# Patient Record
Sex: FEMALE | Race: WHITE | NOT HISPANIC OR LATINO | Employment: FULL TIME | URBAN - METROPOLITAN AREA
[De-identification: names, ages, dates, MRNs, and addresses within clinical notes are randomized per-mention and may not be internally consistent; named-entity substitution may affect disease eponyms.]

---

## 2018-02-08 ENCOUNTER — OFFICE VISIT (OUTPATIENT)
Dept: FAMILY MEDICINE CLINIC | Facility: CLINIC | Age: 38
End: 2018-02-08

## 2018-02-08 VITALS
HEIGHT: 69 IN | RESPIRATION RATE: 20 BRPM | SYSTOLIC BLOOD PRESSURE: 118 MMHG | BODY MASS INDEX: 34.36 KG/M2 | DIASTOLIC BLOOD PRESSURE: 62 MMHG | HEART RATE: 102 BPM | WEIGHT: 232 LBS | OXYGEN SATURATION: 98 % | TEMPERATURE: 98.2 F

## 2018-02-08 DIAGNOSIS — J06.9 URI WITH COUGH AND CONGESTION: ICD-10-CM

## 2018-02-08 DIAGNOSIS — J02.9 SORE THROAT: Primary | ICD-10-CM

## 2018-02-08 LAB
EXPIRATION DATE: NORMAL
EXPIRATION DATE: NORMAL
FLUAV AG NPH QL: NEGATIVE
FLUBV AG NPH QL: NEGATIVE
INTERNAL CONTROL: NORMAL
INTERNAL CONTROL: NORMAL
Lab: NORMAL
Lab: NORMAL
S PYO AG THROAT QL: NEGATIVE

## 2018-02-08 PROCEDURE — 87804 INFLUENZA ASSAY W/OPTIC: CPT | Performed by: NURSE PRACTITIONER

## 2018-02-08 PROCEDURE — 99202 OFFICE O/P NEW SF 15 MIN: CPT | Performed by: NURSE PRACTITIONER

## 2018-02-08 PROCEDURE — 87880 STREP A ASSAY W/OPTIC: CPT | Performed by: NURSE PRACTITIONER

## 2018-02-08 RX ORDER — DEXTROMETHORPHAN HYDROBROMIDE AND PROMETHAZINE HYDROCHLORIDE 15; 6.25 MG/5ML; MG/5ML
5 SYRUP ORAL 4 TIMES DAILY PRN
Qty: 240 ML | Refills: 0 | Status: SHIPPED | OUTPATIENT
Start: 2018-02-08 | End: 2018-02-18

## 2018-02-08 RX ORDER — ALBUTEROL SULFATE 90 UG/1
2 AEROSOL, METERED RESPIRATORY (INHALATION) EVERY 4 HOURS PRN
Qty: 1 INHALER | Refills: 0 | Status: SHIPPED | OUTPATIENT
Start: 2018-02-08 | End: 2018-03-10

## 2018-02-08 NOTE — PATIENT INSTRUCTIONS
"Upper Respiratory Infection, Adult  Most upper respiratory infections (URIs) are a viral infection of the air passages leading to the lungs. A URI affects the nose, throat, and upper air passages. The most common type of URI is nasopharyngitis and is typically referred to as \"the common cold.\"  URIs run their course and usually go away on their own. Most of the time, a URI does not require medical attention, but sometimes a bacterial infection in the upper airways can follow a viral infection. This is called a secondary infection. Sinus and middle ear infections are common types of secondary upper respiratory infections.  Bacterial pneumonia can also complicate a URI. A URI can worsen asthma and chronic obstructive pulmonary disease (COPD). Sometimes, these complications can require emergency medical care and may be life threatening.  What are the causes?  Almost all URIs are caused by viruses. A virus is a type of germ and can spread from one person to another.  What increases the risk?  You may be at risk for a URI if:  · You smoke.  · You have chronic heart or lung disease.  · You have a weakened defense (immune) system.  · You are very young or very old.  · You have nasal allergies or asthma.  · You work in crowded or poorly ventilated areas.  · You work in health care facilities or schools.  What are the signs or symptoms?  Symptoms typically develop 2-3 days after you come in contact with a cold virus. Most viral URIs last 7-10 days. However, viral URIs from the influenza virus (flu virus) can last 14-18 days and are typically more severe. Symptoms may include:  · Runny or stuffy (congested) nose.  · Sneezing.  · Cough.  · Sore throat.  · Headache.  · Fatigue.  · Fever.  · Loss of appetite.  · Pain in your forehead, behind your eyes, and over your cheekbones (sinus pain).  · Muscle aches.  How is this diagnosed?  Your health care provider may diagnose a URI by:  · Physical exam.  · Tests to check that your " symptoms are not due to another condition such as:  ¨ Strep throat.  ¨ Sinusitis.  ¨ Pneumonia.  ¨ Asthma.  How is this treated?  A URI goes away on its own with time. It cannot be cured with medicines, but medicines may be prescribed or recommended to relieve symptoms. Medicines may help:  · Reduce your fever.  · Reduce your cough.  · Relieve nasal congestion.  Follow these instructions at home:  · Take medicines only as directed by your health care provider.  · Gargle warm saltwater or take cough drops to comfort your throat as directed by your health care provider.  · Use a warm mist humidifier or inhale steam from a shower to increase air moisture. This may make it easier to breathe.  · Drink enough fluid to keep your urine clear or pale yellow.  · Eat soups and other clear broths and maintain good nutrition.  · Rest as needed.  · Return to work when your temperature has returned to normal or as your health care provider advises. You may need to stay home longer to avoid infecting others. You can also use a face mask and careful hand washing to prevent spread of the virus.  · Increase the usage of your inhaler if you have asthma.  · Do not use any tobacco products, including cigarettes, chewing tobacco, or electronic cigarettes. If you need help quitting, ask your health care provider.  How is this prevented?  The best way to protect yourself from getting a cold is to practice good hygiene.  · Avoid oral or hand contact with people with cold symptoms.  · Wash your hands often if contact occurs.  There is no clear evidence that vitamin C, vitamin E, echinacea, or exercise reduces the chance of developing a cold. However, it is always recommended to get plenty of rest, exercise, and practice good nutrition.  Contact a health care provider if:  · You are getting worse rather than better.  · Your symptoms are not controlled by medicine.  · You have chills.  · You have worsening shortness of breath.  · You have brown  or red mucus.  · You have yellow or brown nasal discharge.  · You have pain in your face, especially when you bend forward.  · You have a fever.  · You have swollen neck glands.  · You have pain while swallowing.  · You have white areas in the back of your throat.  Get help right away if:  · You have severe or persistent:  ¨ Headache.  ¨ Ear pain.  ¨ Sinus pain.  ¨ Chest pain.  · You have chronic lung disease and any of the following:  ¨ Wheezing.  ¨ Prolonged cough.  ¨ Coughing up blood.  ¨ A change in your usual mucus.  · You have a stiff neck.  · You have changes in your:  ¨ Vision.  ¨ Hearing.  ¨ Thinking.  ¨ Mood.  This information is not intended to replace advice given to you by your health care provider. Make sure you discuss any questions you have with your health care provider.  Document Released: 06/13/2002 Document Revised: 08/20/2017 Document Reviewed: 03/25/2015  [employer] Interactive Patient Education © 2017 [employer]

## 2018-02-08 NOTE — PROGRESS NOTES
"Subjective   Shaunna Vora is a 37 y.o. female.   No chief complaint on file.   Acute Visit  URI    This is a recurrent problem. The current episode started 1 to 4 weeks ago. The problem has been unchanged. There has been no fever. Associated symptoms include congestion, coughing, headaches, a plugged ear sensation, rhinorrhea, sinus pain, sneezing and a sore throat. Pertinent negatives include no ear pain, nausea, vomiting or wheezing. She has tried decongestant and NSAIDs for the symptoms. The treatment provided mild relief.    She reports she was seen 2 weeks ago at a [facility] and was treated for the flu - she completed tamiflu. She returned on 01/26 and was started on ceftin for upper respiratory infection.   She is here today with worsening symptoms 2 days ago. Sore throat was worse. She works at [employer] in [employer].   Her father is positive for influenza A.     The following portions of the patient's history were reviewed and updated as appropriate: allergies, current medications, past family history, past medical history, past social history, past surgical history and problem list.    Review of Systems   Constitutional: Positive for chills and fatigue. Negative for activity change, appetite change and fever.   HENT: Positive for congestion, postnasal drip, rhinorrhea, sinus pain, sneezing and sore throat. Negative for ear pain.    Respiratory: Positive for cough and shortness of breath. Negative for wheezing.         Loose cough - non productive.      Gastrointestinal: Negative.  Negative for abdominal distention, constipation, nausea and vomiting.   Neurological: Positive for headaches.       Objective   Allergies   Allergen Reactions   • Hydrocodone Nausea And Vomiting     Visit Vitals   • /62   • Pulse 102   • Temp 98.2 °F (36.8 °C) (Oral)   • Resp 20   • Ht 175.3 cm (69\")   • Wt 105 kg (232 lb)   • LMP 01/10/2018 (Exact Date)   • SpO2 98%   • BMI 34.26 kg/m2     Physical Exam   Constitutional: She is " oriented to person, place, and time. She appears well-developed and well-nourished. She is cooperative. She does not appear ill.   HENT:   Head: Normocephalic.   Nose: Rhinorrhea present. Right sinus exhibits no maxillary sinus tenderness and no frontal sinus tenderness. Left sinus exhibits no maxillary sinus tenderness and no frontal sinus tenderness.   Mouth/Throat: Uvula is midline. Posterior oropharyngeal erythema present.   Cobblestoning present - posterior oropharyngeal    Eyes: Pupils are equal, round, and reactive to light.   Cardiovascular: Regular rhythm, S1 normal, S2 normal and normal heart sounds.  Tachycardia present.    Pulmonary/Chest: Effort normal and breath sounds normal. No respiratory distress. She has no wheezes.   Lymphadenopathy:     She has no cervical adenopathy.   Neurological: She is alert and oriented to person, place, and time.   Skin: Skin is warm and dry.   Psychiatric: She has a normal mood and affect. Her behavior is normal.   Vitals reviewed.      Assessment/Plan   Diagnoses and all orders for this visit:    Sore throat  -     POCT Influenza A/B  -     POCT rapid strep A    URI with cough and congestion  -     Chlorcyclizine-Pseudoephed 25-60 MG tablet; Take 25 mg by mouth 2 (Two) Times a Day for 21 days.  -     promethazine-dextromethorphan (PROMETHAZINE-DM) 6.25-15 MG/5ML syrup; Take 5 mL by mouth 4 (Four) Times a Day As Needed for Cough for up to 10 days.  -     albuterol (PROVENTIL HFA;VENTOLIN HFA) 108 (90 Base) MCG/ACT inhaler; Inhale 2 puffs Every 4 (Four) Hours As Needed for Wheezing or Shortness of Air (or cough you cannont get under control) for up to 30 days.    see instructions for URI    Follow up with your  PCP as needed.                FRANCISCO J Gtz

## 2019-08-06 ENCOUNTER — OFFICE VISIT (OUTPATIENT)
Dept: INTERNAL MEDICINE | Facility: CLINIC | Age: 39
End: 2019-08-06

## 2019-08-06 VITALS
HEART RATE: 88 BPM | WEIGHT: 223.8 LBS | BODY MASS INDEX: 33.05 KG/M2 | OXYGEN SATURATION: 99 % | DIASTOLIC BLOOD PRESSURE: 108 MMHG | SYSTOLIC BLOOD PRESSURE: 146 MMHG

## 2019-08-06 DIAGNOSIS — R49.0 HOARSENESS OF VOICE: ICD-10-CM

## 2019-08-06 DIAGNOSIS — E04.1 THYROID NODULE: ICD-10-CM

## 2019-08-06 DIAGNOSIS — R03.0 ELEVATED BLOOD PRESSURE READING: ICD-10-CM

## 2019-08-06 DIAGNOSIS — N91.5 OLIGOMENORRHEA, UNSPECIFIED TYPE: ICD-10-CM

## 2019-08-06 DIAGNOSIS — E03.9 HYPOTHYROIDISM, UNSPECIFIED TYPE: Primary | ICD-10-CM

## 2019-08-06 PROCEDURE — 99203 OFFICE O/P NEW LOW 30 MIN: CPT | Performed by: NURSE PRACTITIONER

## 2019-08-06 PROCEDURE — 84443 ASSAY THYROID STIM HORMONE: CPT | Performed by: NURSE PRACTITIONER

## 2019-08-06 PROCEDURE — 84439 ASSAY OF FREE THYROXINE: CPT | Performed by: NURSE PRACTITIONER

## 2019-08-06 PROCEDURE — 80053 COMPREHEN METABOLIC PANEL: CPT | Performed by: NURSE PRACTITIONER

## 2019-08-06 PROCEDURE — 84146 ASSAY OF PROLACTIN: CPT | Performed by: NURSE PRACTITIONER

## 2019-08-06 PROCEDURE — 85025 COMPLETE CBC W/AUTO DIFF WBC: CPT | Performed by: NURSE PRACTITIONER

## 2019-08-06 NOTE — PATIENT INSTRUCTIONS
"DASH Eating Plan  DASH stands for \"Dietary Approaches to Stop Hypertension.\" The DASH eating plan is a healthy eating plan that has been shown to reduce high blood pressure (hypertension). It may also reduce your risk for type 2 diabetes, heart disease, and stroke. The DASH eating plan may also help with weight loss.  What are tips for following this plan?    General guidelines  · Avoid eating more than 2,300 mg (milligrams) of salt (sodium) a day. If you have hypertension, you may need to reduce your sodium intake to 1,500 mg a day.  · Limit alcohol intake to no more than 1 drink a day for nonpregnant women and 2 drinks a day for men. One drink equals 12 oz of beer, 5 oz of wine, or 1½ oz of hard liquor.  · Work with your health care provider to maintain a healthy body weight or to lose weight. Ask what an ideal weight is for you.  · Get at least 30 minutes of exercise that causes your heart to beat faster (aerobic exercise) most days of the week. Activities may include walking, swimming, or biking.  · Work with your health care provider or diet and nutrition specialist (dietitian) to adjust your eating plan to your individual calorie needs.  Reading food labels    · Check food labels for the amount of sodium per serving. Choose foods with less than 5 percent of the Daily Value of sodium. Generally, foods with less than 300 mg of sodium per serving fit into this eating plan.  · To find whole grains, look for the word \"whole\" as the first word in the ingredient list.  Shopping  · Buy products labeled as \"low-sodium\" or \"no salt added.\"  · Buy fresh foods. Avoid canned foods and premade or frozen meals.  Cooking  · Avoid adding salt when cooking. Use salt-free seasonings or herbs instead of table salt or sea salt. Check with your health care provider or pharmacist before using salt substitutes.  · Do not prieto foods. Cook foods using healthy methods such as baking, boiling, grilling, and broiling instead.  · Cook with " heart-healthy oils, such as olive, canola, soybean, or sunflower oil.  Meal planning  · Eat a balanced diet that includes:  ? 5 or more servings of fruits and vegetables each day. At each meal, try to fill half of your plate with fruits and vegetables.  ? Up to 6-8 servings of whole grains each day.  ? Less than 6 oz of lean meat, poultry, or fish each day. A 3-oz serving of meat is about the same size as a deck of cards. One egg equals 1 oz.  ? 2 servings of low-fat dairy each day.  ? A serving of nuts, seeds, or beans 5 times each week.  ? Heart-healthy fats. Healthy fats called Omega-3 fatty acids are found in foods such as flaxseeds and coldwater fish, like sardines, salmon, and mackerel.  · Limit how much you eat of the following:  ? Canned or prepackaged foods.  ? Food that is high in trans fat, such as fried foods.  ? Food that is high in saturated fat, such as fatty meat.  ? Sweets, desserts, sugary drinks, and other foods with added sugar.  ? Full-fat dairy products.  · Do not salt foods before eating.  · Try to eat at least 2 vegetarian meals each week.  · Eat more home-cooked food and less restaurant, buffet, and fast food.  · When eating at a restaurant, ask that your food be prepared with less salt or no salt, if possible.  What foods are recommended?  The items listed may not be a complete list. Talk with your dietitian about what dietary choices are best for you.  Grains  Whole-grain or whole-wheat bread. Whole-grain or whole-wheat pasta. Brown rice. Oatmeal. Quinoa. Bulgur. Whole-grain and low-sodium cereals. Tresa bread. Low-fat, low-sodium crackers. Whole-wheat flour tortillas.  Vegetables  Fresh or frozen vegetables (raw, steamed, roasted, or grilled). Low-sodium or reduced-sodium tomato and vegetable juice. Low-sodium or reduced-sodium tomato sauce and tomato paste. Low-sodium or reduced-sodium canned vegetables.  Fruits  All fresh, dried, or frozen fruit. Canned fruit in natural juice (without  added sugar).  Meat and other protein foods  Skinless chicken or turkey. Ground chicken or turkey. Pork with fat trimmed off. Fish and seafood. Egg whites. Dried beans, peas, or lentils. Unsalted nuts, nut butters, and seeds. Unsalted canned beans. Lean cuts of beef with fat trimmed off. Low-sodium, lean deli meat.  Dairy  Low-fat (1%) or fat-free (skim) milk. Fat-free, low-fat, or reduced-fat cheeses. Nonfat, low-sodium ricotta or cottage cheese. Low-fat or nonfat yogurt. Low-fat, low-sodium cheese.  Fats and oils  Soft margarine without trans fats. Vegetable oil. Low-fat, reduced-fat, or light mayonnaise and salad dressings (reduced-sodium). Canola, safflower, olive, soybean, and sunflower oils. Avocado.  Seasoning and other foods  Herbs. Spices. Seasoning mixes without salt. Unsalted popcorn and pretzels. Fat-free sweets.  What foods are not recommended?  The items listed may not be a complete list. Talk with your dietitian about what dietary choices are best for you.  Grains  Baked goods made with fat, such as croissants, muffins, or some breads. Dry pasta or rice meal packs.  Vegetables  Creamed or fried vegetables. Vegetables in a cheese sauce. Regular canned vegetables (not low-sodium or reduced-sodium). Regular canned tomato sauce and paste (not low-sodium or reduced-sodium). Regular tomato and vegetable juice (not low-sodium or reduced-sodium). Pickles. Olives.  Fruits  Canned fruit in a light or heavy syrup. Fried fruit. Fruit in cream or butter sauce.  Meat and other protein foods  Fatty cuts of meat. Ribs. Fried meat. Cota. Sausage. Bologna and other processed lunch meats. Salami. Fatback. Hotdogs. Bratwurst. Salted nuts and seeds. Canned beans with added salt. Canned or smoked fish. Whole eggs or egg yolks. Chicken or turkey with skin.  Dairy  Whole or 2% milk, cream, and half-and-half. Whole or full-fat cream cheese. Whole-fat or sweetened yogurt. Full-fat cheese. Nondairy creamers. Whipped toppings.  Processed cheese and cheese spreads.  Fats and oils  Butter. Stick margarine. Lard. Shortening. Ghee. Cota fat. Tropical oils, such as coconut, palm kernel, or palm oil.  Seasoning and other foods  Salted popcorn and pretzels. Onion salt, garlic salt, seasoned salt, table salt, and sea salt. Worcestershire sauce. Tartar sauce. Barbecue sauce. Teriyaki sauce. Soy sauce, including reduced-sodium. Steak sauce. Canned and packaged gravies. Fish sauce. Oyster sauce. Cocktail sauce. Horseradish that you find on the shelf. Ketchup. Mustard. Meat flavorings and tenderizers. Bouillon cubes. Hot sauce and Tabasco sauce. Premade or packaged marinades. Premade or packaged taco seasonings. Relishes. Regular salad dressings.  Where to find more information:  · National Heart, Lung, and Blood [place]: www.nhlbi.nih.gov  · American Heart Association: www.heart.org  Summary  · The DASH eating plan is a healthy eating plan that has been shown to reduce high blood pressure (hypertension). It may also reduce your risk for type 2 diabetes, heart disease, and stroke.  · With the DASH eating plan, you should limit salt (sodium) intake to 2,300 mg a day. If you have hypertension, you may need to reduce your sodium intake to 1,500 mg a day.  · When on the DASH eating plan, aim to eat more fresh fruits and vegetables, whole grains, lean proteins, low-fat dairy, and heart-healthy fats.  · Work with your health care provider or diet and nutrition specialist (dietitian) to adjust your eating plan to your individual calorie needs.  This information is not intended to replace advice given to you by your health care provider. Make sure you discuss any questions you have with your health care provider.  Document Released: 12/06/2012 Document Revised: 12/11/2017 Document Reviewed: 12/11/2017  [employer] Interactive Patient Education © 2019 [employer]

## 2019-08-06 NOTE — PROGRESS NOTES
Chief Complaint   Patient presents with   • Establish Care       History of Present Illness  38 y.o.female presents for establish care and FU thyroid nodule. Prior pcp Matias Hancock who retired.   No chronic meds.  Hx migraines intermittent no meds.  Need thryoid checked with hx nodules in past couple years. had US radioactive pill told waterbased nodules thinks was done a couple years ago.  Positive Wt gain, irregular periods not as often. Voice hoarseness    Pap always normal last one around 2 years ago.  No hx of htn.    Review of Systems   Constitutional: Positive for fatigue and unexpected weight gain. Negative for chills and fever.   HENT: Positive for rhinorrhea and voice change.    Respiratory: Negative for shortness of breath.    Cardiovascular: Negative for chest pain, palpitations and leg swelling.   Gastrointestinal: Negative for abdominal pain, constipation, diarrhea, nausea and GERD.   Genitourinary: Positive for menstrual problem. Negative for difficulty urinating.   Musculoskeletal: Negative for arthralgias.   Skin: Negative for rash.   Neurological: Negative for headache.   Psychiatric/Behavioral: Negative for depressed mood. The patient is not nervous/anxious.        [facility]  The following portions of the patient's history were reviewed and updated as appropriate: allergies, current medications, past family history, past medical history, past social history, past surgical history and problem list.     Social hx:  Tobacco none  Alcohol none  Past Medical History:   Diagnosis Date   • Asthma     childhood   • GERD (gastroesophageal reflux disease)    • Migraines       Past Surgical History:   Procedure Laterality Date   • WISDOM TOOTH EXTRACTION        Allergies   Allergen Reactions   • Hydrocodone Nausea And Vomiting      Family History   Problem Relation Age of Onset   • Heart disease Mother    • COPD Father    • Cancer Maternal Grandmother    • Cancer Maternal Grandfather    • Cancer Paternal  Grandfather           No current outpatient medications on file.    VITALS:  BP (!) 146/108   Pulse 88   Wt 102 kg (223 lb 12.8 oz)   SpO2 99%   Breastfeeding? No   BMI 33.05 kg/m²     Physical Exam   Constitutional: She is oriented to person, place, and time. She appears well-developed and well-nourished. No distress.   HENT:   Head: Normocephalic.   Right Ear: External ear normal.   Left Ear: External ear normal.   Nose: Nose normal.   Mouth/Throat: Oropharynx is clear and moist.   Eyes: EOM are normal. Pupils are equal, round, and reactive to light.   Neck: Normal range of motion. Neck supple. Thyromegaly present.   Thyroid enlarged on right   Cardiovascular: Normal rate, regular rhythm, normal heart sounds and intact distal pulses.   No edema   Pulmonary/Chest: Effort normal and breath sounds normal. No respiratory distress.   Abdominal: Soft. Bowel sounds are normal. There is no tenderness.   Musculoskeletal: Normal range of motion.   Normal ROM all major joints   Lymphadenopathy:     She has no cervical adenopathy.   Neurological: She is alert and oriented to person, place, and time.   Skin: Skin is warm and dry. Capillary refill takes less than 2 seconds. No rash noted.   Psychiatric: She has a normal mood and affect. Her behavior is normal.   Vitals reviewed.      LABS  Results for orders placed or performed in visit on 08/06/19   Comprehensive Metabolic Panel   Result Value Ref Range    Glucose 101 (H) 65 - 99 mg/dL    BUN 16 6 - 20 mg/dL    Creatinine 0.93 0.57 - 1.00 mg/dL    Sodium 140 136 - 145 mmol/L    Potassium 4.4 3.5 - 5.2 mmol/L    Chloride 102 98 - 107 mmol/L    CO2 25.2 22.0 - 29.0 mmol/L    Calcium 9.5 8.6 - 10.5 mg/dL    Total Protein 7.6 6.0 - 8.5 g/dL    Albumin 4.60 3.50 - 5.20 g/dL    ALT (SGPT) 47 (H) 1 - 33 U/L    AST (SGOT) 27 1 - 32 U/L    Alkaline Phosphatase 55 39 - 117 U/L    Total Bilirubin 0.3 0.2 - 1.2 mg/dL    eGFR Non African Amer 68 >60 mL/min/1.73    Globulin 3.0 gm/dL     A/G Ratio 1.5 g/dL    BUN/Creatinine Ratio 17.2 7.0 - 25.0    Anion Gap 12.8 5.0 - 15.0 mmol/L   TSH   Result Value Ref Range    TSH 31.700 (H) 0.270 - 4.200 mIU/mL   T4, Free   Result Value Ref Range    Free T4 0.77 (L) 0.93 - 1.70 ng/dL   Prolactin   Result Value Ref Range    Prolactin 13.70 4.79 - 23.30 ng/mL   CBC Auto Differential   Result Value Ref Range    WBC 6.60 3.40 - 10.80 10*3/mm3    RBC 4.81 3.77 - 5.28 10*6/mm3    Hemoglobin 13.5 12.0 - 15.9 g/dL    Hematocrit 42.6 34.0 - 46.6 %    MCV 88.6 79.0 - 97.0 fL    MCH 28.1 26.6 - 33.0 pg    MCHC 31.7 31.5 - 35.7 g/dL    RDW 13.2 12.3 - 15.4 %    RDW-SD 43.1 37.0 - 54.0 fl    MPV 10.4 6.0 - 12.0 fL    Platelets 232 140 - 450 10*3/mm3    Neutrophil % 62.9 42.7 - 76.0 %    Lymphocyte % 22.1 19.6 - 45.3 %    Monocyte % 8.0 5.0 - 12.0 %    Eosinophil % 5.3 0.3 - 6.2 %    Basophil % 1.1 0.0 - 1.5 %    Immature Grans % 0.6 (H) 0.0 - 0.5 %    Neutrophils, Absolute 4.15 1.70 - 7.00 10*3/mm3    Lymphocytes, Absolute 1.46 0.70 - 3.10 10*3/mm3    Monocytes, Absolute 0.53 0.10 - 0.90 10*3/mm3    Eosinophils, Absolute 0.35 0.00 - 0.40 10*3/mm3    Basophils, Absolute 0.07 0.00 - 0.20 10*3/mm3    Immature Grans, Absolute 0.04 0.00 - 0.05 10*3/mm3    nRBC 0.0 0.0 - 0.2 /100 WBC       ASSESSMENT/PLAN  Shaunna was seen today for establish care.    Diagnoses and all orders for this visit:    Hypothyroidism, unspecified type  -     levothyroxine (SYNTHROID, LEVOTHROID) 75 MCG tablet; Take 1 tablet by mouth Daily.  -     Ambulatory Referral to Endocrinology    Thyroid nodule  -     TSH  -     T4, Free  -     CBC Auto Differential  -     Ambulatory Referral to Endocrinology    Hoarseness of voice  -     Prolactin  -     Testosterone    Oligomenorrhea, unspecified type  -     Prolactin  -     Testosterone    Elevated blood pressure reading  Comments:  DASH diet; need low sodium diet <1500mg day. heart healthy diet exercise. will recheck at next visit. if remains elevated will  discuss meds. goal <130/80  Orders:  -     CBC & Differential  -     Comprehensive Metabolic Panel      I discussed the patients findings and my recommendations with patient.  Patient was encouraged to keep me informed of any acute changes, lack of improvement, or any new concerning symptoms.    Patient voiced understanding of all instructions and denied further questions.      FOLLOW-UP  Return in about 6 weeks (around 9/17/2019), or if symptoms worsen or fail to improve.  Need fu in 6 weeks to recheck thyroid labs  Electronically signed by:    FRANCISCO J Alford  08/06/2019

## 2019-08-07 LAB
ALBUMIN SERPL-MCNC: 4.6 G/DL (ref 3.5–5.2)
ALBUMIN/GLOB SERPL: 1.5 G/DL
ALP SERPL-CCNC: 55 U/L (ref 39–117)
ALT SERPL W P-5'-P-CCNC: 47 U/L (ref 1–33)
ANION GAP SERPL CALCULATED.3IONS-SCNC: 12.8 MMOL/L (ref 5–15)
AST SERPL-CCNC: 27 U/L (ref 1–32)
BASOPHILS # BLD AUTO: 0.07 10*3/MM3 (ref 0–0.2)
BASOPHILS NFR BLD AUTO: 1.1 % (ref 0–1.5)
BILIRUB SERPL-MCNC: 0.3 MG/DL (ref 0.2–1.2)
BUN BLD-MCNC: 16 MG/DL (ref 6–20)
BUN/CREAT SERPL: 17.2 (ref 7–25)
CALCIUM SPEC-SCNC: 9.5 MG/DL (ref 8.6–10.5)
CHLORIDE SERPL-SCNC: 102 MMOL/L (ref 98–107)
CO2 SERPL-SCNC: 25.2 MMOL/L (ref 22–29)
CREAT BLD-MCNC: 0.93 MG/DL (ref 0.57–1)
DEPRECATED RDW RBC AUTO: 43.1 FL (ref 37–54)
EOSINOPHIL # BLD AUTO: 0.35 10*3/MM3 (ref 0–0.4)
EOSINOPHIL NFR BLD AUTO: 5.3 % (ref 0.3–6.2)
ERYTHROCYTE [DISTWIDTH] IN BLOOD BY AUTOMATED COUNT: 13.2 % (ref 12.3–15.4)
GFR SERPL CREATININE-BSD FRML MDRD: 68 ML/MIN/1.73
GLOBULIN UR ELPH-MCNC: 3 GM/DL
GLUCOSE BLD-MCNC: 101 MG/DL (ref 65–99)
HCT VFR BLD AUTO: 42.6 % (ref 34–46.6)
HGB BLD-MCNC: 13.5 G/DL (ref 12–15.9)
IMM GRANULOCYTES # BLD AUTO: 0.04 10*3/MM3 (ref 0–0.05)
IMM GRANULOCYTES NFR BLD AUTO: 0.6 % (ref 0–0.5)
LYMPHOCYTES # BLD AUTO: 1.46 10*3/MM3 (ref 0.7–3.1)
LYMPHOCYTES NFR BLD AUTO: 22.1 % (ref 19.6–45.3)
MCH RBC QN AUTO: 28.1 PG (ref 26.6–33)
MCHC RBC AUTO-ENTMCNC: 31.7 G/DL (ref 31.5–35.7)
MCV RBC AUTO: 88.6 FL (ref 79–97)
MONOCYTES # BLD AUTO: 0.53 10*3/MM3 (ref 0.1–0.9)
MONOCYTES NFR BLD AUTO: 8 % (ref 5–12)
NEUTROPHILS # BLD AUTO: 4.15 10*3/MM3 (ref 1.7–7)
NEUTROPHILS NFR BLD AUTO: 62.9 % (ref 42.7–76)
NRBC BLD AUTO-RTO: 0 /100 WBC (ref 0–0.2)
PLATELET # BLD AUTO: 232 10*3/MM3 (ref 140–450)
PMV BLD AUTO: 10.4 FL (ref 6–12)
POTASSIUM BLD-SCNC: 4.4 MMOL/L (ref 3.5–5.2)
PROLACTIN SERPL-MCNC: 13.7 NG/ML (ref 4.79–23.3)
PROT SERPL-MCNC: 7.6 G/DL (ref 6–8.5)
RBC # BLD AUTO: 4.81 10*6/MM3 (ref 3.77–5.28)
SODIUM BLD-SCNC: 140 MMOL/L (ref 136–145)
T4 FREE SERPL-MCNC: 0.77 NG/DL (ref 0.93–1.7)
TSH SERPL DL<=0.05 MIU/L-ACNC: 31.7 MIU/ML (ref 0.27–4.2)
WBC NRBC COR # BLD: 6.6 10*3/MM3 (ref 3.4–10.8)

## 2019-08-07 RX ORDER — LEVOTHYROXINE SODIUM 0.07 MG/1
75 TABLET ORAL DAILY
Qty: 30 TABLET | Refills: 1 | Status: SHIPPED | OUTPATIENT
Start: 2019-08-07 | End: 2019-09-30 | Stop reason: SDUPTHER

## 2019-09-27 ENCOUNTER — OFFICE VISIT (OUTPATIENT)
Dept: ENDOCRINOLOGY | Facility: CLINIC | Age: 39
End: 2019-09-27

## 2019-09-27 VITALS
DIASTOLIC BLOOD PRESSURE: 80 MMHG | HEART RATE: 86 BPM | SYSTOLIC BLOOD PRESSURE: 130 MMHG | WEIGHT: 227 LBS | OXYGEN SATURATION: 98 % | BODY MASS INDEX: 33.62 KG/M2 | HEIGHT: 69 IN

## 2019-09-27 DIAGNOSIS — R23.2 HOT FLASHES: ICD-10-CM

## 2019-09-27 DIAGNOSIS — E04.1 SOLITARY THYROID NODULE: ICD-10-CM

## 2019-09-27 DIAGNOSIS — E03.8 SECONDARY HYPOTHYROIDISM: Primary | ICD-10-CM

## 2019-09-27 LAB
ESTRADIOL SERPL HS-MCNC: 15.4 PG/ML
FSH SERPL-ACNC: 36.1 MIU/ML
LH SERPL-ACNC: 28.4 MIU/ML
T4 FREE SERPL-MCNC: 1.27 NG/DL (ref 0.93–1.7)
TESTOST SERPL-MCNC: 17.8 NG/DL (ref 8.4–48.1)
TSH SERPL DL<=0.05 MIU/L-ACNC: 4.2 UIU/ML (ref 0.27–4.2)

## 2019-09-27 PROCEDURE — 84439 ASSAY OF FREE THYROXINE: CPT | Performed by: NURSE PRACTITIONER

## 2019-09-27 PROCEDURE — 83001 ASSAY OF GONADOTROPIN (FSH): CPT | Performed by: NURSE PRACTITIONER

## 2019-09-27 PROCEDURE — 83002 ASSAY OF GONADOTROPIN (LH): CPT | Performed by: NURSE PRACTITIONER

## 2019-09-27 PROCEDURE — 82670 ASSAY OF TOTAL ESTRADIOL: CPT | Performed by: NURSE PRACTITIONER

## 2019-09-27 PROCEDURE — 99244 OFF/OP CNSLTJ NEW/EST MOD 40: CPT | Performed by: INTERNAL MEDICINE

## 2019-09-27 PROCEDURE — 84403 ASSAY OF TOTAL TESTOSTERONE: CPT | Performed by: NURSE PRACTITIONER

## 2019-09-27 PROCEDURE — 84443 ASSAY THYROID STIM HORMONE: CPT | Performed by: NURSE PRACTITIONER

## 2019-09-27 NOTE — PROGRESS NOTES
Chief Complaint   Patient presents with   • Establish Care   • Hypothyroidism     referred by FRANCISCO J Gould   • Thyroid Nodule        New patient who is being seen in consultation regarding thyroidism and history of thyroid nodules at the request of Viki Gould APRN HPI   Shaunna Vora is a 38 y.o. female with a history of migraines, GERD, asthma who presents for evaluation of hypothyroidism and thyroid nodules.    She reports a thyroid dysfunction was initially discovered last month and patient was started on levothyroxine 75 mcg daily.  Patient reports a history of thyroid uptake and scan. She also had thyroid ultrasound in the past and was told that she had fluid filled nodules. She reports this was approximately 10 years ago. She never had thyroid biopsy or repeat ultrasound. She denies any difficulty breathing or difficulty swallowing. She does report intermittent hoarseness over the past few years. She reports this is more prominent with long conversations or if she is especially tired.     Patient denies palpitations or anxiety.  Patient denies changes in bowel habits.  Patient  reports heat intolerance.  Patient reports changes in weight. Patient lost 60 pounds while on keto diet last year and has regained approximately 15 pounds.   Patient  Reports hair loss while on the keto diet which has now improved.  Patient denies tremor.  Patient reports decreased energy level.    Patient  history of previous head/neck radiation.  Patient denies history of recent iodine exposure.  Patient denies taking OTC supplements such as biotin.  Patient denies personal or family history of thyroid cancer.     She reports irregular periods for approximately 1 year. She reports she has been cycling every 1-3 months.  Patient also reports history of intermittent hot flashes over the past year.  She is reports that she first noticed these when she was adhering to the keto diet.  She is unsure if they are changed  since stopping this diet.  She reports that hot flashes typically last about a minute and can occur at any time of day.  She denies any associated symptoms such as palpitations headache or flushing of her cheeks.  She reports that her mother underwent menopause in her late 40s early 50s.    Past Medical History:   Diagnosis Date   • Asthma     childhood   • GERD (gastroesophageal reflux disease)    • Migraines      Past Surgical History:   Procedure Laterality Date   • WISDOM TOOTH EXTRACTION        Family History   Problem Relation Age of Onset   • Heart disease Mother    • Arthritis Mother    • Heart attack Mother    • Hyperlipidemia Mother    • Obesity Mother    • Diabetes Mother    • Hypertension Mother    • COPD Father    • Migraines Father    • Hypertension Father    • Cancer Maternal Grandmother    • Hyperthyroidism Maternal Grandmother    • Cancer Maternal Grandfather    • Cancer Paternal Grandfather    • Kidney disease Paternal Grandmother       Social History     Socioeconomic History   • Marital status: Single     Spouse name: Not on file   • Number of children: Not on file   • Years of education: Not on file   • Highest education level: Not on file   Tobacco Use   • Smoking status: Never Smoker   • Smokeless tobacco: Never Used   Substance and Sexual Activity   • Alcohol use: No   • Drug use: No   • Sexual activity: No      Allergies   Allergen Reactions   • Hydrocodone Nausea And Vomiting      Current Outpatient Medications on File Prior to Visit   Medication Sig Dispense Refill   • levothyroxine (SYNTHROID, LEVOTHROID) 75 MCG tablet Take 1 tablet by mouth Daily. 30 tablet 1     No current facility-administered medications on file prior to visit.         Review of Systems   Constitutional: Positive for fatigue. Negative for unexpected weight gain.   HENT: Positive for postnasal drip and voice change. Negative for trouble swallowing.    Eyes: Negative for double vision and itching.   Respiratory:  "Negative for cough and shortness of breath.    Gastrointestinal: Negative for abdominal pain, constipation, diarrhea and nausea.   Endocrine: Positive for heat intolerance. Negative for cold intolerance.   Genitourinary: Positive for menstrual problem.   Musculoskeletal: Negative for arthralgias and myalgias.   Skin: Negative for dry skin and rash.   Neurological: Negative for tremors and weakness.   Psychiatric/Behavioral: Negative for sleep disturbance. The patient is not nervous/anxious.         Vitals:    09/27/19 0956   BP: 130/80   Pulse: 86   SpO2: 98%   Weight: 103 kg (227 lb)   Height: 175.3 cm (69\")   Body mass index is 33.52 kg/m².     Physical Exam   Constitutional: Vital signs are normal. She appears well-developed and well-nourished. She is cooperative.   HENT:   Head: Normocephalic and atraumatic.   Mouth/Throat: Oropharynx is clear and moist and mucous membranes are normal.   Eyes: Conjunctivae and EOM are normal. Pupils are equal, round, and reactive to light.   Neck: Trachea normal. Thyromegaly present.   Patient with fullness of the right thyroid lobe.  No discrete nodules palpable.   Cardiovascular: Normal rate, regular rhythm and intact distal pulses.   No murmur heard.  Pulmonary/Chest: Effort normal and breath sounds normal. No respiratory distress.   Abdominal: Soft. Bowel sounds are normal. She exhibits no distension. There is no hepatosplenomegaly. There is no tenderness.   Lymphadenopathy:        Head (right side): No submandibular adenopathy present.        Head (left side): No submandibular adenopathy present.     She has no cervical adenopathy.   Neurological: She is alert. She has normal reflexes.   Skin: Skin is warm, dry and intact. No rash noted.   Psychiatric: She has a normal mood and affect. Her behavior is normal.        Labs/Imaging  Results for ROMINA POWERS (MRN [id number]) as of 9/27/2019 09:55   Ref. Range 5/11/2015 10:13 2/8/2018 15:18 2/8/2018 15:19 8/6/2019 15:47 "   TSH Baseline Latest Ref Range: 0.270 - 4.200 mIU/mL 4.530   31.700 (H)   Free T4 Latest Ref Range: 0.93 - 1.70 ng/dL    0.77 (L)     Assessment and Plan    Shaunna was seen today for establish care, hypothyroidism and thyroid nodule.    Diagnoses and all orders for this visit:    Secondary hypothyroidism  - Labs from early August indicative of hypothyroidism.  - Patient currently taking levothyroxine 75 mcg daily.  We reviewed appropriate administration of this medication.  - Will check labs today and adjust levothyroxine as clinically indicated.  -     T4, Free  -     TSH    Solitary thyroid nodule  - The patient reports history of cystic thyroid nodule.  Records of previous ultrasound are not available.  Will request records from PCP.  - Patient has fullness in the right thyroid lobe on exam.  Depending on results of previous ultrasound may need to consider repeat.  - Patient denies compressive symptoms.  She does have intermittent hoarseness   -     T4, Free  -     TSH    Hot flashes/irregular periods  - Discussed the patient's menstrual irregularities could be related to hypothyroidism.  However, given concurrent hot flashes will evaluate for laboratory evidence of early menopause.  -     Follicle Stimulating Hormone  -     Luteinizing Hormone  -     Estradiol         Return in about 3 months (around 12/27/2019) for Next scheduled follow up. The patient was instructed to contact the clinic with any interval questions or concerns.    Lorene Franklin MD     Please note that portions of this document were completed using a voice recognition program. Efforts were made to edit the dictations, but occasionally words are mis-transcribed.

## 2019-09-30 ENCOUNTER — TELEPHONE (OUTPATIENT)
Dept: ENDOCRINOLOGY | Facility: CLINIC | Age: 39
End: 2019-09-30

## 2019-09-30 DIAGNOSIS — E03.9 HYPOTHYROIDISM, UNSPECIFIED TYPE: ICD-10-CM

## 2019-09-30 RX ORDER — LEVOTHYROXINE SODIUM 0.07 MG/1
75 TABLET ORAL DAILY
Qty: 30 TABLET | Refills: 3 | Status: SHIPPED | OUTPATIENT
Start: 2019-09-30 | End: 2020-02-05 | Stop reason: SDUPTHER

## 2019-09-30 NOTE — TELEPHONE ENCOUNTER
11/20/19  Left message at the Medical Records office, [phone] asking for someone to return my call.  Need status of request for records.        11/15/19  Liza from [facility] medical records - [phone], states they don't have any of the medical records we requested for Dr. Hancock's office at [facility].    Thyroid uptake  Thyroid scan  Thyroid US      Dr. Franklin, please advice.  Thank you.            11/15/19  Spoke with Samira from Dr. Decker's office and she states she does not have anything related to patient's thyroid that she can see in her system.    Samira suggested for me to call Medical Records at [phone], they would help me get records for patient that are from years ago.  Call this number and left a message asking someone to call me back and let me know the status of my records request that I fax on 09/30/19.  Samira stated that she could see where medical records dept. Had received my request for medical records for the patient.          Patient call, asking if she needed more tests after Dr. Franklin sees her old medical records.  Left a message for patient explaining that I faxed the request for medical records on 9/30/19, but I have not received anything.  It can take up to 30 days, suggested to see if she can call Dr. Franklin Decker's office and get an update.  Will forward patient's message to Dr. Franklin.      Call Dr. Franklin Decker's office for un update on the request for records I faxed to [phone], on 9/30/19.  The message for records states it can take up to 30 days for records.  Left message asking for someone to call me and give me an update on the request.    Fax records request to Dr. Franklin Decker's office for:  Previous thyroid uptake and scan and thyroid ultrasounds from approximately 10 years ago.  To [phone].          Left message (Dr. Franklin Decker's office, [phone]) requesting medical records requested by Dr. Franklin, with patient's  information and my information as well.  Message states it takes up to 30 days for to obtain records.      ----- Message from Lorene Franklin MD sent at 9/30/2019  8:29 AM EDT -----  Ms. Ye,     Could you please request results of previous thyroid uptake and scan and thyroid ultrasounds from the office of patient's previous PCP (Dr. Hancock) at the [facility]? Of note, these are from approximately 10 years ago. Please let me know if they say these records are no longer accessible.    Sincerely,     Lorene

## 2019-09-30 NOTE — TELEPHONE ENCOUNTER
Informed patient of lab results.  Patient voiced understanding.    ----- Message from Lorene Franklin MD sent at 9/30/2019 10:41 AM EDT -----  Please contact the patient and let her know that I have reviewed her most recent labs. Her thyroid function tests are now within the reference range. She should continue on her current dose of levothyroxine. Also, her FSH and LH are at the upper end of the expected range while her estradiol is toward the lower end. These labs are somewhat difficult to interpret as the normal values vary based on the phases of the menstrual cycle and it is difficult to know which phase she is in as she has not be cycling regularly. However, the pattern could be consistent with early menopause. If her cycles do not normalize now that thyroid function is normal, she may need further evaluation with gynecology. Also, please let her know that I will contact her with an update once I am able to review records from her PCP regarding previous thyroid testing.

## 2019-09-30 NOTE — TELEPHONE ENCOUNTER
Left message for patient to return my call.    ----- Message from Lorene Franklin MD sent at 9/30/2019 10:41 AM EDT -----  Please contact the patient and let her know that I have reviewed her most recent labs. Her thyroid function tests are now within the reference range. She should continue on her current dose of levothyroxine. Also, her FSH and LH are at the upper end of the expected range while her estradiol is toward the lower end. These labs are somewhat difficult to interpret as the normal values vary based on the phases of the menstrual cycle and it is difficult to know which phase she is in as she has not be cycling regularly. However, the pattern could be consistent with early menopause. If her cycles do not normalize now that thyroid function is normal, she may need further evaluation with gynecology. Also, please let her know that I will contact her with an update once I am able to review records from her PCP regarding previous thyroid testing.

## 2019-09-30 NOTE — TELEPHONE ENCOUNTER
Patient requesting med refill for Levothyroxine.  Patient states she just has one weeks worth of medication left. Patient has appointment on 10/14/19.   Forwarding to Dr. Franklin for approval.

## 2019-10-01 NOTE — TELEPHONE ENCOUNTER
Hi from [facility] call, asking to see if they could refill the Levothyroxine with brand name, the are currently out of the generic brand..  Ok, to replace generic brand for name brand, pt has taken it before.        verbal order from Dr. Franklin, pt to continue with [facility] brand for Levothyroxine.  Fax to [facility] at [phone]

## 2019-10-14 ENCOUNTER — OFFICE VISIT (OUTPATIENT)
Dept: INTERNAL MEDICINE | Facility: CLINIC | Age: 39
End: 2019-10-14

## 2019-10-14 VITALS
HEART RATE: 101 BPM | DIASTOLIC BLOOD PRESSURE: 80 MMHG | SYSTOLIC BLOOD PRESSURE: 132 MMHG | WEIGHT: 226 LBS | BODY MASS INDEX: 33.47 KG/M2 | TEMPERATURE: 98.3 F | HEIGHT: 69 IN | OXYGEN SATURATION: 100 %

## 2019-10-14 DIAGNOSIS — I10 BENIGN ESSENTIAL HTN: Primary | ICD-10-CM

## 2019-10-14 PROCEDURE — 99213 OFFICE O/P EST LOW 20 MIN: CPT | Performed by: NURSE PRACTITIONER

## 2019-10-14 NOTE — PROGRESS NOTES
Chief Complaint   Patient presents with   • Hypertension     bp check       History of Present Illness  38 y.o.female presents for bp check.  Last seen in aug with elevated bp 140's/ 108. Has been doing lifestyle changes with diet and exercise. Doing better. bp improved. Has seen endocrine for her hypothyroid; taking synthroid ok. Menses still has not resumed and undergoing possible menopause workup.  Otherwise doing ok no headaches, vision changes, dizziness or chest pain.      Review of Systems   Constitutional: Negative for chills, fatigue and fever.   Eyes: Negative for blurred vision and visual disturbance.   Respiratory: Negative for shortness of breath.    Cardiovascular: Negative for chest pain, palpitations and leg swelling.   Genitourinary: Negative for difficulty urinating.   Neurological: Negative for dizziness, light-headedness and headache.       [facility]  The following portions of the patient's history were reviewed and updated as appropriate: allergies, current medications, past family history, past medical history, past social history, past surgical history and problem list.       Past Medical History:   Diagnosis Date   • Asthma     childhood   • GERD (gastroesophageal reflux disease)    • Migraines       Past Surgical History:   Procedure Laterality Date   • WISDOM TOOTH EXTRACTION        Allergies   Allergen Reactions   • Hydrocodone Nausea And Vomiting      Family History   Problem Relation Age of Onset   • Heart disease Mother    • Arthritis Mother    • Heart attack Mother    • Hyperlipidemia Mother    • Obesity Mother    • Diabetes Mother    • Hypertension Mother    • COPD Father    • Migraines Father    • Hypertension Father    • Cancer Maternal Grandmother    • Hyperthyroidism Maternal Grandmother    • Cancer Maternal Grandfather    • Cancer Paternal Grandfather    • Kidney disease Paternal Grandmother             Current Outpatient Medications:   •  levothyroxine (SYNTHROID, LEVOTHROID) 75 MCG  "tablet, Take 1 tablet by mouth Daily., Disp: 30 tablet, Rfl: 3    VITALS:  /80   Pulse 101   Temp 98.3 °F (36.8 °C)   Ht 175.3 cm (69\")   Wt 103 kg (226 lb)   SpO2 100%   BMI 33.37 kg/m²     Physical Exam   Constitutional: She appears well-developed and well-nourished. No distress.   Pulmonary/Chest: Effort normal.   Neurological: She is alert.   Skin: Skin is warm and dry.   Psychiatric: She has a normal mood and affect.       LABS  No new labs    ASSESSMENT/PLAN  Shaunna was seen today for hypertension.    Diagnoses and all orders for this visit:    Benign essential HTN    cont low sodium heart healthy diet exercise.   No bp meds for now since improved. Cont to monitor.    I discussed the patients findings and my recommendations with patient.  Patient was encouraged to keep me informed of any acute changes, lack of improvement, or any new concerning symptoms.    Patient voiced understanding of all instructions and denied further questions.      FOLLOW-UP  Return in about 6 months (around 4/14/2020), or if symptoms worsen or fail to improve, for Annual with pap.    Electronically signed by:    FRANCISCO J Alford  10/14/2019      "

## 2019-10-17 ENCOUNTER — TELEPHONE (OUTPATIENT)
Dept: INTERNAL MEDICINE | Facility: CLINIC | Age: 39
End: 2019-10-17

## 2019-10-17 NOTE — TELEPHONE ENCOUNTER
The patient will likely need an ultrasound of the thyroid regardless of her old test results, I was just hoping to get the records so that if we needed anything in addition to the US it could all be completed at once. However, if she wants me to go ahead and schedule the US given the long wait for her old records, I'm happy to go ahead and order it. Whichever she prefers is fine.

## 2019-10-17 NOTE — TELEPHONE ENCOUNTER
PATIENT IS CALLING IN REGARDS TO MOST RECENT VISIT WITH DR FREEDMAN. SHE WOULD LIKE TO KNOW IF SHE IS STILL NEEDING A CERTAIN AMOUNT OF TESTS BASED ON HER OLD MEDICAL RECORDS THAT WERE SENT TO US FROM HER OLD DR. PLEASE CONTACT -[phone]

## 2019-10-17 NOTE — TELEPHONE ENCOUNTER
Left message for patient informing her I had fax a request for medical records to Dr. Reid's office on 9/30/19.  However, it can take up to 30 days for us to get these records, based on a message they have on their medical records line.  Just call, left them a message asking for a callback/update on my request.    Dr. Franklin, please advice.  Does patient need more tests, or do you have to wait to see her old medical records?  Than edna.

## 2019-10-18 NOTE — TELEPHONE ENCOUNTER
Patient states she would like for us to call her on Monday, if possible with the Thyroid US information.  Therefore, we can go ahead and schedule it.  Informed patient that Dr. Franklin was out of the office this afternoon but  Hopefully we could contact her on Monday with more information on the US of the Thyroid.  Patient agreed.    Dr. Franklin, please can you schedule the thyroid US. Thank you.

## 2019-10-21 ENCOUNTER — TELEPHONE (OUTPATIENT)
Dept: INTERNAL MEDICINE | Facility: CLINIC | Age: 39
End: 2019-10-21

## 2019-10-21 NOTE — TELEPHONE ENCOUNTER
Per Dr. Franklin: Could you please contact MsKatherin Diony and schedule a thyroid US with Dr. Navarrete?    Lvm for patient to return my call to schedule.

## 2019-11-14 ENCOUNTER — OFFICE VISIT (OUTPATIENT)
Dept: ENDOCRINOLOGY | Facility: CLINIC | Age: 39
End: 2019-11-14

## 2019-11-14 VITALS
HEIGHT: 69 IN | OXYGEN SATURATION: 100 % | HEART RATE: 93 BPM | BODY MASS INDEX: 34.9 KG/M2 | WEIGHT: 235.6 LBS | DIASTOLIC BLOOD PRESSURE: 90 MMHG | SYSTOLIC BLOOD PRESSURE: 148 MMHG

## 2019-11-14 DIAGNOSIS — R93.89 ABNORMAL THYROID ULTRASOUND: Primary | ICD-10-CM

## 2019-11-14 PROCEDURE — 76536 US EXAM OF HEAD AND NECK: CPT | Performed by: INTERNAL MEDICINE

## 2019-11-14 NOTE — PROGRESS NOTES
Thyroid Ultrasound 11/14/19    Indication: Suspected thyroid nodule  Comparison Imaging: None  Clinical History:  Reported thyroid nodule/cyst 10 years ago, hypothyroidism    Real time high resolution imaging of the thyroid gland was performed in transverse and longitudinal planes. Previous imaging reports were reviewed if available and compared to the current to assess stability.     Lobes:  The right lobe measured 4.9 cm L x 2.1 cm AP x 2.5 cm in TV dimension.    The isthmus measured 0.75 cm in thickness.    The left thyroid lobe measured 6.8 cm L x 2.0 cm AP x 2.3 cm in TV dimension.    Thyroid gland is diffusely hypoechoic and heterogeneous and contains no nodules.  Due to the heterogeneous nature she has some areas of pseudo-nodules, but no distinct nodules.    No pathologic lymph nodes were seen.    Impression:  Heterogeneous and slightly enlarged gland consistent with likely autoimmune thyroid disease.    Recommendation:  No routine ultrasound monitoring is necessary.

## 2019-12-05 ENCOUNTER — TELEPHONE (OUTPATIENT)
Dept: ENDOCRINOLOGY | Facility: CLINIC | Age: 39
End: 2019-12-05

## 2019-12-05 NOTE — TELEPHONE ENCOUNTER
12/5/19  Left message on Medical Records Office recording.  There is no option to speak to someone.  Ask them to return my call.    Previous message was left on 11/20/19.

## 2019-12-05 NOTE — TELEPHONE ENCOUNTER
----- Message from Lorene Franklin MD sent at 9/30/2019  8:29 AM EDT -----  Ms. Ye,     Could you please request results of previous thyroid uptake and scan and thyroid ultrasounds from the office of patient's previous PCP (Dr. Hancock) at the [facility]? Of note, these are from approximately 10 years ago. Please let me know if they say these records are no longer accessible.    Sincerely,     Lorene

## 2019-12-19 NOTE — TELEPHONE ENCOUNTER
12/19/19  Left message asking for someone to contact me and let me know the status of the request for records I faxed back on 9/30/19.  Medical Records  [phone]    I have left several messages, asking for someone to contact me:    11/20/19 and 12/5/19  No one has gotten back to me as of today.

## 2019-12-23 ENCOUNTER — TELEPHONE (OUTPATIENT)
Dept: ENDOCRINOLOGY | Facility: CLINIC | Age: 39
End: 2019-12-23

## 2019-12-23 NOTE — TELEPHONE ENCOUNTER
Left a message for someone to return my call.  Medical Records  [phone].    Have previously called and left message, and as of today no one has returned my call.  Messages left on:  11/20/19, 12/5/19 and 12/19/19.

## 2019-12-23 NOTE — TELEPHONE ENCOUNTER
----- Message from Lorene Franklin MD sent at 9/30/2019  8:29 AM EDT -----  Ms. Ye,     Could you please request results of previous thyroid uptake and scan and thyroid ultrasounds from the office of patient's previous PCP (Dr. Hancock) at the [facility], [place]? Of note, these are from approximately 10 years ago. Please let me know if they say these records are no longer accessible.    Sincerely,     Lorene

## 2020-01-15 ENCOUNTER — TELEPHONE (OUTPATIENT)
Dept: ENDOCRINOLOGY | Facility: CLINIC | Age: 40
End: 2020-01-15

## 2020-01-15 NOTE — TELEPHONE ENCOUNTER
----- Message from Lorene Franklin MD sent at 9/30/2019  8:29 AM EDT -----  Ms. Ye,     Could you please request results of previous thyroid uptake and scan and thyroid ultrasounds from the office of patient's previous PCP (Dr. Hancock) at the [facility]? Of note, these are from approximately 10 years ago. Please let me know if they say these records are no longer accessible.    Sincerely,     Lorene

## 2020-01-15 NOTE — TELEPHONE ENCOUNTER
Call for status on request for records.    [facility] Medical records at [phone]  Left message asking for someone to return my call.  This is the 5th message I leave in this automatic recoding that I get when I call the medical records department.  As of today, no one has gotten back to me, and I still don't have the records I requested back in October, 2019.    Dr. Franklin, please advice.  Thank you.

## 2020-01-16 NOTE — TELEPHONE ENCOUNTER
Given patient's recent US, prior records unlikely to alter plan of care. Please document efforts to obtain records and then no further follow up is required.

## 2020-02-05 DIAGNOSIS — E03.9 HYPOTHYROIDISM, UNSPECIFIED TYPE: ICD-10-CM

## 2020-02-05 RX ORDER — LEVOTHYROXINE SODIUM 0.07 MG/1
75 TABLET ORAL DAILY
Qty: 30 TABLET | Refills: 3 | Status: SHIPPED | OUTPATIENT
Start: 2020-02-05 | End: 2020-06-15

## 2020-05-19 ENCOUNTER — TELEPHONE (OUTPATIENT)
Dept: INTERNAL MEDICINE | Facility: CLINIC | Age: 40
End: 2020-05-19

## 2020-05-19 NOTE — TELEPHONE ENCOUNTER
----- Message from Fabiola Ceballos sent at 5/19/2020  3:24 PM EDT -----      ----- Message -----  From: Viki Gould APRN  Sent: 5/19/2020  12:27 PM EDT  To: Fabiola Ceballos    Pt needs annual with pap rescheduled from April cancel please.

## 2020-05-19 NOTE — TELEPHONE ENCOUNTER
----- Message from FRANCISCO J Farrar sent at 5/17/2020  5:13 PM EDT -----  Can we contact pt to reschedule her annual with pap. Canceled in April due to pandemic.

## 2020-06-15 DIAGNOSIS — E03.9 HYPOTHYROIDISM, UNSPECIFIED TYPE: ICD-10-CM

## 2020-06-15 RX ORDER — LEVOTHYROXINE SODIUM 0.07 MG/1
TABLET ORAL
Qty: 30 TABLET | Refills: 0 | Status: SHIPPED | OUTPATIENT
Start: 2020-06-15 | End: 2020-07-24

## 2020-07-24 DIAGNOSIS — E03.9 HYPOTHYROIDISM, UNSPECIFIED TYPE: ICD-10-CM

## 2020-07-24 RX ORDER — LEVOTHYROXINE SODIUM 0.07 MG/1
TABLET ORAL
Qty: 30 TABLET | Refills: 0 | Status: SHIPPED | OUTPATIENT
Start: 2020-07-24 | End: 2020-08-12

## 2020-08-11 ENCOUNTER — LAB (OUTPATIENT)
Dept: LAB | Facility: HOSPITAL | Age: 40
End: 2020-08-11

## 2020-08-11 ENCOUNTER — OFFICE VISIT (OUTPATIENT)
Dept: ENDOCRINOLOGY | Facility: CLINIC | Age: 40
End: 2020-08-11

## 2020-08-11 VITALS
DIASTOLIC BLOOD PRESSURE: 78 MMHG | HEIGHT: 69 IN | SYSTOLIC BLOOD PRESSURE: 132 MMHG | WEIGHT: 241.4 LBS | OXYGEN SATURATION: 100 % | HEART RATE: 82 BPM | BODY MASS INDEX: 35.76 KG/M2

## 2020-08-11 DIAGNOSIS — E03.9 HYPOTHYROIDISM, UNSPECIFIED TYPE: Primary | ICD-10-CM

## 2020-08-11 DIAGNOSIS — L65.9 HAIR LOSS: ICD-10-CM

## 2020-08-11 LAB
T4 FREE SERPL-MCNC: 1.19 NG/DL (ref 0.93–1.7)
TSH SERPL DL<=0.05 MIU/L-ACNC: 9.26 UIU/ML (ref 0.27–4.2)

## 2020-08-11 PROCEDURE — 84443 ASSAY THYROID STIM HORMONE: CPT | Performed by: INTERNAL MEDICINE

## 2020-08-11 PROCEDURE — 99213 OFFICE O/P EST LOW 20 MIN: CPT | Performed by: INTERNAL MEDICINE

## 2020-08-11 PROCEDURE — 84439 ASSAY OF FREE THYROXINE: CPT | Performed by: INTERNAL MEDICINE

## 2020-08-11 NOTE — PROGRESS NOTES
"Chief Complaint   Patient presents with   • Follow-up   • Secondary Hypothyroidism     hair is thinning, since April 2020        HPI   Shaunna Vora is a 39 y.o. female had concerns including Follow-up and Secondary Hypothyroidism (hair is thinning, since April 2020).      Patient reports only interval illness was strep throat this prior summer. Patient reports that she has been staying busy and has continued to work through COVID.  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  Patient did have interval thyroid ultrasound since last visit which did not note any thyroid nodules.  Discussed with patient that we are unable to obtain her prior records despite multiple attempts.  Patient continues on levothyroxine 75 mcg daily for hypothyroidism.  She reports that her only new concern is for hair thinning which she noticed starting in April.  She denies any changes in her nails, constipation, fatigue.  She does report that she has gained some weight at last visit.  She takes her medication first thing in the morning on empty stomach and denies any missed doses.    The following portions of the patient's history were reviewed and updated as appropriate: allergies, current medications and past social history.    Review of Systems   Constitutional: Positive for unexpected weight gain. Negative for fatigue.   Respiratory: Negative for cough and shortness of breath.    Cardiovascular: Negative for chest pain and palpitations.   Gastrointestinal: Negative for constipation and diarrhea.      /78   Pulse 82   Ht 174 cm (68.5\")   Wt 109 kg (241 lb 6.4 oz)   SpO2 100%   BMI 36.17 kg/m²      Physical Exam      Constitutional:  well developed; well nourished  no acute distress  obese - Body mass index is 36.17 kg/m².   ENT/Thyroid: no thyromegaly   Eyes: Conjunctiva: clear   Respiratory:  breathing is unlabored  clear to auscultation bilaterally   Cardiovascular:  regular rate " and rhythm   Chest:  Not performed.   Abdomen: soft, non-tender; no masses   : Not performed.   Musculoskeletal: Not performed   Skin: dry and warm   Neuro: mental status, speech normal   Psych: mood and affect are within normal limits       Labs/Imaging  Results for SHAUNNA POWERS (MRN [id number]) as of 8/11/2020 11:22   Ref. Range 8/6/2019 15:47 9/27/2019 11:00   TSH Baseline Latest Ref Range: 0.270 - 4.200 uIU/mL 31.700 (H) 4.200   Free T4 Latest Ref Range: 0.93 - 1.70 ng/dL 0.77 (L) 1.27       Shaunna was seen today for follow-up and secondary hypothyroidism.    Diagnoses and all orders for this visit:    Hypothyroidism, unspecified type  -Patient taking levothyroxine 75 mcg  -Most recent TSH was within normal limits in September 2019  -Patient with notable concerns regarding hair loss  -We will repeat thyroid function testing and adjust levothyroxine as clinically indicated based on labs  -Symptoms of both hypothyroidism and hyperthyroidism were discussed with the patient in detail, patient to contact the office in the interim between appointments with any concerning changes.  -     TSH  -     T4, Free    Hair loss  -Evaluating hypothyroidism, as above  -Given timing of symptoms, suspect stress may be a factor    History of thyroid nodule  -Patient repeat ultrasound in 2020 that did not reveal thyroid nodule, routine follow-up ultrasound is not required    Return in about 4 months (around 12/11/2020) for Next scheduled follow up. The patient was instructed to contact the clinic with any interval questions or concerns.    Lorene Franklin MD   Endocrinologist    Please note that portions of this document were completed with a voice recognition program. Efforts were made to edit the dictations, but occasionally words are mis-transcribed.

## 2020-08-12 ENCOUNTER — TELEPHONE (OUTPATIENT)
Dept: ENDOCRINOLOGY | Facility: CLINIC | Age: 40
End: 2020-08-12

## 2020-08-12 RX ORDER — LEVOTHYROXINE SODIUM 88 UG/1
88 TABLET ORAL DAILY
Qty: 30 TABLET | Refills: 2 | Status: SHIPPED | OUTPATIENT
Start: 2020-08-12 | End: 2020-11-12

## 2020-08-12 NOTE — TELEPHONE ENCOUNTER
----- Message from Lorene Franklin MD sent at 8/12/2020  8:08 AM EDT -----  Please contact patient, repeat TSH is above goal. I would like to increase levothyroxine to 88 mcg daily and will order repeat labs for 2 months from now.

## 2020-11-12 DIAGNOSIS — E03.9 HYPOTHYROIDISM, UNSPECIFIED TYPE: ICD-10-CM

## 2020-11-12 RX ORDER — LEVOTHYROXINE SODIUM 88 UG/1
88 TABLET ORAL DAILY
Qty: 30 TABLET | Refills: 2 | Status: SHIPPED | OUTPATIENT
Start: 2020-11-12 | End: 2021-03-15

## 2020-11-12 NOTE — TELEPHONE ENCOUNTER
Dr. Franklin, refill request.  Thank you.      LOV:  08/11/20  Future visit:  12/14/20      Last refill:  08/12/20  Q.  30  R.  2  Sig:  Take 1 tablet by mouth daily.    [facility] pharmacy.

## 2021-02-15 ENCOUNTER — TELEPHONE (OUTPATIENT)
Dept: ENDOCRINOLOGY | Facility: CLINIC | Age: 41
End: 2021-02-15

## 2021-02-15 ENCOUNTER — PRIOR AUTHORIZATION (OUTPATIENT)
Dept: ENDOCRINOLOGY | Facility: CLINIC | Age: 41
End: 2021-02-15

## 2021-02-15 NOTE — TELEPHONE ENCOUNTER
Asked pt to contact her insurance [employer], ask what med is under their formulary if they don't cover Levothyroxine.  Tried to do PA via CoverMyMeds, but not able to verify patient.  Patient states she has the same insurance, but will contact her insurance and call me back.

## 2021-03-14 DIAGNOSIS — E03.9 HYPOTHYROIDISM, UNSPECIFIED TYPE: ICD-10-CM

## 2021-03-15 RX ORDER — LEVOTHYROXINE SODIUM 88 UG/1
88 TABLET ORAL DAILY
Qty: 30 TABLET | Refills: 3 | Status: SHIPPED | OUTPATIENT
Start: 2021-03-15 | End: 2021-04-15 | Stop reason: SDUPTHER

## 2021-03-15 NOTE — TELEPHONE ENCOUNTER
Refill request.    LOV:  08/11/20  No future appt. Scheduled    LOV:  11/12/20  Q.  30  R.  2  Sig:  TAKE 1 TABLET BY MOUTH DAILY      [facility].

## 2021-04-14 ENCOUNTER — TELEPHONE (OUTPATIENT)
Dept: ENDOCRINOLOGY | Facility: CLINIC | Age: 41
End: 2021-04-14

## 2021-04-14 NOTE — TELEPHONE ENCOUNTER
Pt called to schedule her appt with Dr Franklin scheduled for 6/28. She wanted to come in now for labs and will also need us to call in her thyroid medication to [facility]    pts number if you have questions [phone]

## 2021-04-15 DIAGNOSIS — E03.9 HYPOTHYROIDISM, UNSPECIFIED TYPE: ICD-10-CM

## 2021-04-15 RX ORDER — LEVOTHYROXINE SODIUM 88 UG/1
88 TABLET ORAL DAILY
Qty: 30 TABLET | Refills: 3 | Status: SHIPPED | OUTPATIENT
Start: 2021-04-15 | End: 2021-06-29

## 2021-04-15 NOTE — TELEPHONE ENCOUNTER
Informed patient, labs are in chart, pt will come to the clinic and have them done.  Message was sent to Dr. Franklin for refill on thyroid med.

## 2021-06-28 ENCOUNTER — LAB (OUTPATIENT)
Dept: LAB | Facility: HOSPITAL | Age: 41
End: 2021-06-28

## 2021-06-28 ENCOUNTER — OFFICE VISIT (OUTPATIENT)
Dept: ENDOCRINOLOGY | Facility: CLINIC | Age: 41
End: 2021-06-28

## 2021-06-28 VITALS
HEIGHT: 69 IN | HEART RATE: 123 BPM | SYSTOLIC BLOOD PRESSURE: 140 MMHG | WEIGHT: 248.4 LBS | DIASTOLIC BLOOD PRESSURE: 74 MMHG | BODY MASS INDEX: 36.79 KG/M2 | OXYGEN SATURATION: 98 %

## 2021-06-28 DIAGNOSIS — E04.0 SIMPLE GOITER: ICD-10-CM

## 2021-06-28 DIAGNOSIS — R13.10 DYSPHAGIA, UNSPECIFIED TYPE: ICD-10-CM

## 2021-06-28 DIAGNOSIS — L65.9 HAIR LOSS: ICD-10-CM

## 2021-06-28 DIAGNOSIS — E03.9 HYPOTHYROIDISM, UNSPECIFIED TYPE: Primary | ICD-10-CM

## 2021-06-28 PROCEDURE — 84443 ASSAY THYROID STIM HORMONE: CPT | Performed by: INTERNAL MEDICINE

## 2021-06-28 PROCEDURE — 99214 OFFICE O/P EST MOD 30 MIN: CPT | Performed by: INTERNAL MEDICINE

## 2021-06-28 PROCEDURE — 84439 ASSAY OF FREE THYROXINE: CPT | Performed by: INTERNAL MEDICINE

## 2021-06-28 PROCEDURE — 84402 ASSAY OF FREE TESTOSTERONE: CPT | Performed by: INTERNAL MEDICINE

## 2021-06-28 PROCEDURE — 84403 ASSAY OF TOTAL TESTOSTERONE: CPT | Performed by: INTERNAL MEDICINE

## 2021-06-28 NOTE — PROGRESS NOTES
"Chief Complaint   Patient presents with   • Follow-up   • Hypothyroidism        HPI   Shaunna Vora is a 40 y.o. female had concerns including Follow-up and Hypothyroidism.      Patient denies any significant health or medication changes in the interim since her last visit.  She is currently taking levothyroxine 88 mcg daily, she denies any interval labs.  She denies any changes in bowel habits.  She does report intermittent heat and cold intolerance.  She denies any missed doses of medications.  She does report that she believes her hair loss is cyclical and seems to be improving now that it is summer.  She does note that she has had more hair loss at the crown compared to prior.  Patient also reports that she has noticed more difficulty swallowing, specifically in the morning when she first eats something solid.  She does report a history of seasonal allergies.  She denies any history of gastric reflux.    The following portions of the patient's history were reviewed and updated as appropriate: allergies, current medications and past social history.    Review of Systems   HENT: Positive for trouble swallowing. Negative for voice change.    Gastrointestinal: Negative for constipation and diarrhea.   Endocrine: Positive for cold intolerance and heat intolerance.      /74   Pulse (!) 123   Ht 174 cm (68.5\")   Wt 113 kg (248 lb 6.4 oz)   SpO2 98%   BMI 37.22 kg/m²      Physical Exam      Constitutional:  well developed; well nourished  no acute distress   ENT/Thyroid: enlarged   Eyes: Conjunctiva: clear   Respiratory:  breathing is unlabored  clear to auscultation bilaterally   Cardiovascular:  regular rate and rhythm HR normalized after rest   Chest:  Not performed.   Abdomen: soft, non-tender; no masses   : Not performed.   Musculoskeletal: Not performed   Skin: dry and warm   Neuro: normal without focal findings and mental status, speech normal   Psych: oriented to time, place and person, mood and " affect are within normal limits       Labs/Imaging  Results for ROMINA POWERS (MRN [id number]) as of 6/28/2021 15:20   Ref. Range 8/11/2020 08:55   TSH Baseline Latest Ref Range: 0.270 - 4.200 uIU/mL 9.260 (H)   Free T4 Latest Ref Range: 0.93 - 1.70 ng/dL 1.19       Diagnoses and all orders for this visit:    1. Hypothyroidism, unspecified type (Primary)  -Currently taking levothyroxine 88 mcg daily  -Repeat labs today and adjust levothyroxine as clinically indicated based on results  -     TSH  -     T4, Free    2. Hair loss  - evaluating thyroid function, as above  - check testosterone levels given pattern of hair loss at the crown of head  -     Testosterone, F Eqlib & T LC / MS    3. Simple goiter  4. Dysphagia, unspecified type  - thyroid exam not significantly changed from prior  - will plan for repeat US given change in symptoms.  -     US Thyroid; Future    Return in about 3 months (around 9/28/2021) for Next scheduled follow up. The patient was instructed to contact the clinic with any interval questions or concerns.    Lorene Franklin MD   Endocrinologist    Please note that portions of this document were completed with a voice recognition program. Efforts were made to edit the dictations, but occasionally words are mis-transcribed.

## 2021-06-29 ENCOUNTER — TELEPHONE (OUTPATIENT)
Dept: ENDOCRINOLOGY | Facility: CLINIC | Age: 41
End: 2021-06-29

## 2021-06-29 LAB
T4 FREE SERPL-MCNC: 1.16 NG/DL (ref 0.93–1.7)
TSH SERPL DL<=0.05 MIU/L-ACNC: 6.95 UIU/ML (ref 0.27–4.2)

## 2021-06-29 RX ORDER — LEVOTHYROXINE SODIUM 112 UG/1
112 TABLET ORAL DAILY
Qty: 30 TABLET | Refills: 3 | Status: SHIPPED | OUTPATIENT
Start: 2021-06-29 | End: 2021-11-29

## 2021-06-29 NOTE — TELEPHONE ENCOUNTER
----- Message from Lorene Franklin MD sent at 6/29/2021  8:00 AM EDT -----  See letter, please notify patient of dose change.

## 2021-06-29 NOTE — TELEPHONE ENCOUNTER
PATIENT WOULD LIKE A CALL BACK ABOUT TSH LEVELS. SHE IS AWARE WE ARE CHANGING DOSAGE OF MEDICATION BUT WAS NOT TOLD THE TSH RESULTS. PHONE NUMBER [phone]

## 2021-07-07 LAB
TESTOST FREE MFR SERPL: 4.1 % (ref 0.5–2.8)
TESTOST FREE SERPL-MCNC: 0.79 NG/DL (ref 0.1–0.85)
TESTOST SERPL-MCNC: 19.3 NG/DL (ref 10–55)

## 2021-11-09 NOTE — TELEPHONE ENCOUNTER
PT CALLED STATING THAT HER INSURANCE DOES NOT REQUIRE A PA FOR HER THYROID MEDICATION. SHE STATED THAT HER INSURANCE TOLD HER THAT THE PROBLEM COULD BE FROM [employer]. PT REQUESTED A CALL BACK FROM US. SHE ALSO STATED SHE WILL CONTACT [employer] AROUND 2:00pm TODAY. THANK YOU  
What Type Of Note Output Would You Prefer (Optional)?: Standard Output
patient states her thyroid med does not need a PA, probably an error on [facility] part.   
How Severe Is Your Rash?: moderate
Is This A New Presentation, Or A Follow-Up?: Rash

## 2021-11-26 DIAGNOSIS — E03.9 HYPOTHYROIDISM, UNSPECIFIED TYPE: ICD-10-CM

## 2021-11-29 RX ORDER — LEVOTHYROXINE SODIUM 112 UG/1
112 TABLET ORAL DAILY
Qty: 30 TABLET | Refills: 5 | Status: SHIPPED | OUTPATIENT
Start: 2021-11-29 | End: 2022-01-05 | Stop reason: SDUPTHER

## 2022-01-04 ENCOUNTER — LAB (OUTPATIENT)
Dept: LAB | Facility: HOSPITAL | Age: 42
End: 2022-01-04

## 2022-01-04 ENCOUNTER — OFFICE VISIT (OUTPATIENT)
Dept: ENDOCRINOLOGY | Facility: CLINIC | Age: 42
End: 2022-01-04

## 2022-01-04 VITALS
HEART RATE: 95 BPM | OXYGEN SATURATION: 98 % | BODY MASS INDEX: 37.47 KG/M2 | HEIGHT: 69 IN | SYSTOLIC BLOOD PRESSURE: 139 MMHG | DIASTOLIC BLOOD PRESSURE: 86 MMHG | WEIGHT: 253 LBS

## 2022-01-04 DIAGNOSIS — E03.9 HYPOTHYROIDISM, UNSPECIFIED TYPE: Primary | ICD-10-CM

## 2022-01-04 DIAGNOSIS — R21 RASH: ICD-10-CM

## 2022-01-04 PROCEDURE — 84443 ASSAY THYROID STIM HORMONE: CPT | Performed by: INTERNAL MEDICINE

## 2022-01-04 PROCEDURE — 99214 OFFICE O/P EST MOD 30 MIN: CPT | Performed by: INTERNAL MEDICINE

## 2022-01-04 PROCEDURE — 84439 ASSAY OF FREE THYROXINE: CPT | Performed by: INTERNAL MEDICINE

## 2022-01-04 NOTE — PROGRESS NOTES
Chief Complaint   Patient presents with   • Hypothyroidism        HPI   Shaunna Vora is a 41 y.o. female had concerns including Hypothyroidism.      Patient followed for hypothyroidism, last seen in June 2021, she did have dose increase in thyroid hormone at that time to 112 mcg daily.  She denies any weight changes, changes in bowel habits, palpitations.  She denies any missed doses of medication.  She does report concern regarding intermittent hives which have been occurring since approximately August 2021.  She reports she has had intermittent hives develop on the upper extremities, chest, neck, abdomen.  She reports that episodes of hives development progressively worsened from August to December 2021 but believes they may be improving at this time.  She reports that she has not had any changes in exposures.  She denies any significant dietary changes.  She does report that Benadryl cream helps resolve the itching but denies any other alleviating factors.  She denies any prior history of hives or other allergic reaction.  She has not followed up with PCP for any other provider regarding this.  She has been allergy tested in the past, approximately 15 years ago and did not have any significant allergies at that time.  She denies any changes in medication other than dose change in levothyroxine and does inquire if medication could be causing hives.  She does report symptoms also developed shortly after she received her second Covid vaccine.     The following portions of the patient's history were reviewed and updated as appropriate: allergies, current medications and past social history.    Review of Systems   Constitutional: Negative for unexpected weight gain and unexpected weight loss.   Cardiovascular: Negative for palpitations.   Gastrointestinal: Negative for constipation and diarrhea.   Endocrine: Negative for cold intolerance, heat intolerance and polyuria.   Skin: Positive for rash.       /86  "(BP Location: Right arm, Patient Position: Sitting, Cuff Size: Adult)   Pulse 95   Ht 174 cm (68.5\")   Wt 115 kg (253 lb)   SpO2 98%   BMI 37.91 kg/m²      Physical Exam      Constitutional:  well developed; well nourished  no acute distress  obese - Body mass index is 37.91 kg/m².   ENT/Thyroid: not examined   Eyes: Conjunctiva: clear   Respiratory:  breathing is unlabored  clear to auscultation bilaterally   Cardiovascular:  regular rate and rhythm   Chest:  Not performed.   Abdomen: soft, non-tender; no masses   : Not performed.   Musculoskeletal: Not performed   Skin: dry and warm   Neuro: mental status, speech normal   Psych: mood and affect are within normal limits       Labs/Imaging    Results for ROMINA POWERS (MRN [id number]) as of 1/4/2022 17:43   Ref. Range 6/28/2021 15:40   TSH Baseline Latest Ref Range: 0.270 - 4.200 uIU/mL 6.950 (H)   Free T4 Latest Ref Range: 0.93 - 1.70 ng/dL 1.16         Diagnoses and all orders for this visit:    1. Hypothyroidism, unspecified type (Primary)  -Currently taking levothyroxine 112 mcg daily, increased following last visit  -Clinically euthyroid  -Plan to repeat thyroid function testing today and adjust dose as clinically indicated  -Patient reports some concern for possible reaction to medication as hives started approximately 1 month following dose change, patient given samples of tirosint during office visit in case reaction is due to dye/preservatives in medication.  -     T4, Free  -     TSH    2. Rash  -Patient reports symptoms developed starting in August 2021 and have worsened over time  -Rash not present during office visit but photographs provided by patient consistent with hives  -We will change to Tirosint to see if symptoms resolve, if not, would recommend follow-up with PCP/ dermatology       No follow-ups on file. The patient was instructed to contact the clinic with any interval questions or concerns.    Lorene Franklin MD "   Endocrinologist    Dictated Utilizing Dragon Dictation

## 2022-01-05 LAB
T4 FREE SERPL-MCNC: 1.45 NG/DL (ref 0.93–1.7)
TSH SERPL DL<=0.05 MIU/L-ACNC: 3.59 UIU/ML (ref 0.27–4.2)

## 2022-01-05 RX ORDER — LEVOTHYROXINE SODIUM 112 UG/1
112 TABLET ORAL DAILY
Qty: 30 TABLET | Refills: 5
Start: 2022-01-05 | End: 2022-07-15 | Stop reason: SDUPTHER

## 2022-07-15 DIAGNOSIS — E03.9 HYPOTHYROIDISM, UNSPECIFIED TYPE: ICD-10-CM

## 2022-07-15 RX ORDER — LEVOTHYROXINE SODIUM 112 UG/1
112 TABLET ORAL DAILY
Qty: 30 TABLET | Refills: 5 | Status: SHIPPED | OUTPATIENT
Start: 2022-07-15 | End: 2023-02-03

## 2022-07-15 RX ORDER — LEVOTHYROXINE SODIUM 112 UG/1
112 TABLET ORAL DAILY
Qty: 30 TABLET | Refills: 5
Start: 2022-07-15 | End: 2022-07-15 | Stop reason: SDUPTHER

## 2022-07-15 NOTE — TELEPHONE ENCOUNTER
Patient called needs refill on Levothyroxine please use [facility] at [email or web address] [place] location.  Thank you.

## 2022-08-16 ENCOUNTER — OFFICE VISIT (OUTPATIENT)
Dept: ENDOCRINOLOGY | Facility: CLINIC | Age: 42
End: 2022-08-16

## 2022-08-16 VITALS
OXYGEN SATURATION: 97 % | WEIGHT: 248 LBS | SYSTOLIC BLOOD PRESSURE: 134 MMHG | DIASTOLIC BLOOD PRESSURE: 82 MMHG | HEART RATE: 112 BPM | BODY MASS INDEX: 36.73 KG/M2 | HEIGHT: 69 IN

## 2022-08-16 DIAGNOSIS — E03.9 HYPOTHYROIDISM, UNSPECIFIED TYPE: Primary | ICD-10-CM

## 2022-08-16 DIAGNOSIS — R63.8 UNABLE TO LOSE WEIGHT: ICD-10-CM

## 2022-08-16 DIAGNOSIS — L65.9 HAIR LOSS: ICD-10-CM

## 2022-08-16 PROCEDURE — 99214 OFFICE O/P EST MOD 30 MIN: CPT | Performed by: INTERNAL MEDICINE

## 2022-08-16 RX ORDER — DEXAMETHASONE 1 MG
1 TABLET ORAL ONCE
Qty: 1 TABLET | Refills: 0 | Status: SHIPPED | OUTPATIENT
Start: 2022-08-16 | End: 2022-08-16

## 2022-08-16 NOTE — PROGRESS NOTES
"Chief Complaint   Patient presents with   • Hypothyroidism        HPI   Shaunna Vora is a 41 y.o. female had concerns including Hypothyroidism.      Patient presents for follow-up of hypothyroidism.  She reports that the rash she is experiencing at last visit resolved without intervention.  She does report concerns for inability to lose weight despite regular exercise and dietary restriction as well as ongoing diffuse hair loss of the scalp and eyebrows.    The following portions of the patient's history were reviewed and updated as appropriate: allergies, current medications and past social history.    Review of Systems   Constitutional: Negative for fatigue and unexpected weight loss.   Cardiovascular: Negative for palpitations.   Gastrointestinal: Negative for constipation and diarrhea.        /82 (BP Location: Left arm, Patient Position: Sitting, Cuff Size: Adult)   Pulse 112   Ht 174 cm (68.5\")   Wt 112 kg (248 lb)   SpO2 97%   BMI 37.16 kg/m²      Physical Exam      Constitutional:  well developed; well nourished  no acute distress  obese - Body mass index is 37.16 kg/m².   ENT/Thyroid: not examined   Eyes: Conjunctiva: clear   Respiratory:  breathing is unlabored  clear to auscultation bilaterally   Cardiovascular:  regular rate and rhythm   Chest:  Not performed.   Abdomen: soft, non-tender; no masses   : Not performed.   Musculoskeletal: Not performed   Skin: not performed.   Neuro: mental status, speech normal   Psych: mood and affect are within normal limits       Labs/Imaging   Latest Reference Range & Units 01/04/22 13:56   TSH Baseline 0.270 - 4.200 uIU/mL 3.590   Free T4 0.93 - 1.70 ng/dL 1.45       Diagnoses and all orders for this visit:    1. Hypothyroidism, unspecified type (Primary)  Currently taking levothyroxine 112 mcg daily  Patient reports an inability to lose weight as well as hearing loss  Plan to update thyroid function testing and adjust medication as clinically " indicated  Appropriate administration of thyroid hormone was reviewed  -     TSH; Future  -     T4, Free; Future    2. Hair loss  Reviewed with patient that hair loss is nonspecific and could be multifactorial.  Given most recent thyroid function testing was normal it would not be appropriate to attribute this to hypothyroidism.  Check iron studies, testosterone  -     Iron Profile; Future  -     Testosterone, F Eqlib & T LC / MS; Future    3. Unable to lose weight  Reviewed endocrine causes of inability to lose weight  Evaluating thyroid function, as above  Plan for dexamethasone suppression testing  -     Cortisol - AM; Future  -     Dexamethasone, Serum; Future    Other orders  -     dexamethasone (DECADRON) 1 MG tablet; Take 1 tablet by mouth 1 (One) Time for 1 dose. Take at 11 PM night before lab draw  Dispense: 1 tablet; Refill: 0         Return in about 6 months (around 2/16/2023) for Next scheduled follow up. The patient was instructed to contact the clinic with any interval questions or concerns.    Lorene Franklin MD   Endocrinologist    Dictated Utilizing Dragon Dictation

## 2022-08-17 ENCOUNTER — LAB (OUTPATIENT)
Dept: LAB | Facility: HOSPITAL | Age: 42
End: 2022-08-17

## 2022-08-17 ENCOUNTER — LAB (OUTPATIENT)
Dept: ENDOCRINOLOGY | Facility: CLINIC | Age: 42
End: 2022-08-17

## 2022-08-17 DIAGNOSIS — L65.9 HAIR LOSS: ICD-10-CM

## 2022-08-17 DIAGNOSIS — R63.8 UNABLE TO LOSE WEIGHT: ICD-10-CM

## 2022-08-17 DIAGNOSIS — E03.9 HYPOTHYROIDISM, UNSPECIFIED TYPE: ICD-10-CM

## 2022-08-17 LAB
CORTIS AM PEAK SERPL-MCNC: 0.33 MCG/DL
IRON 24H UR-MRATE: 67 MCG/DL (ref 37–145)
IRON SATN MFR SERPL: 18 % (ref 20–50)
T4 FREE SERPL-MCNC: 1.26 NG/DL (ref 0.93–1.7)
TIBC SERPL-MCNC: 374 MCG/DL (ref 298–536)
TRANSFERRIN SERPL-MCNC: 251 MG/DL (ref 200–360)
TSH SERPL DL<=0.05 MIU/L-ACNC: 2.43 UIU/ML (ref 0.27–4.2)

## 2022-08-17 PROCEDURE — 82533 TOTAL CORTISOL: CPT

## 2022-08-17 PROCEDURE — 84403 ASSAY OF TOTAL TESTOSTERONE: CPT

## 2022-08-17 PROCEDURE — 83540 ASSAY OF IRON: CPT | Performed by: INTERNAL MEDICINE

## 2022-08-17 PROCEDURE — 84439 ASSAY OF FREE THYROXINE: CPT | Performed by: INTERNAL MEDICINE

## 2022-08-17 PROCEDURE — 36415 COLL VENOUS BLD VENIPUNCTURE: CPT

## 2022-08-17 PROCEDURE — 84466 ASSAY OF TRANSFERRIN: CPT | Performed by: INTERNAL MEDICINE

## 2022-08-17 PROCEDURE — 80299 QUANTITATIVE ASSAY DRUG: CPT

## 2022-08-17 PROCEDURE — 84443 ASSAY THYROID STIM HORMONE: CPT | Performed by: INTERNAL MEDICINE

## 2022-08-17 PROCEDURE — 84402 ASSAY OF FREE TESTOSTERONE: CPT

## 2022-08-23 ENCOUNTER — TELEPHONE (OUTPATIENT)
Dept: ENDOCRINOLOGY | Facility: CLINIC | Age: 42
End: 2022-08-23

## 2022-08-23 LAB — DEXAMETHASONE SERPL-MCNC: 307 NG/DL

## 2022-08-24 LAB
TESTOST FREE MFR SERPL: 3.22 % (ref 0.5–2.8)
TESTOST FREE SERPL-MCNC: 0.52 NG/DL (ref 0.1–0.85)
TESTOST SERPL-MCNC: 16.3 NG/DL

## 2022-08-30 NOTE — TELEPHONE ENCOUNTER
Called the pt and told her Testosterone was still pending. And that once completed someone would be in touch with her or via mail. She verbalized understanding.  
PATIENT WOULD LIKE A CALL BACK TO DISCUSS LAB RESULTS. PHONE NUMBER [phone]  
PT CALLED REQUESTING A CALL BACK WHEN HER LABS COME IN TO CONSULT.  
Please advise on testosterone lab  
Pt notified and verbalized understanding.  
See result note, please advise if she has a specific question  
no

## 2023-02-03 DIAGNOSIS — E03.9 HYPOTHYROIDISM, UNSPECIFIED TYPE: ICD-10-CM

## 2023-02-03 RX ORDER — LEVOTHYROXINE SODIUM 112 UG/1
112 TABLET ORAL DAILY
Qty: 30 TABLET | Refills: 0 | Status: SHIPPED | OUTPATIENT
Start: 2023-02-03 | End: 2023-02-06 | Stop reason: SDUPTHER

## 2023-02-06 DIAGNOSIS — E03.9 HYPOTHYROIDISM, UNSPECIFIED TYPE: ICD-10-CM

## 2023-02-06 NOTE — TELEPHONE ENCOUNTER
Pt called to cancel appt for 2/7/23. She has the flu. Dr to's next appt isn't until 4/5/23.    She needs refill on synthroid. She is almost out.     Please call pt to advise if dr to would like for her to go have labs drawn, but please send in refill for this month if possible.       Call pt can leave message  [phone]    [facility]:  Phone: [phone] Fax: [phone]

## 2023-02-07 RX ORDER — LEVOTHYROXINE SODIUM 112 UG/1
112 TABLET ORAL DAILY
Qty: 30 TABLET | Refills: 2 | Status: SHIPPED | OUTPATIENT
Start: 2023-02-07 | End: 2023-04-06 | Stop reason: SDUPTHER

## 2023-04-05 ENCOUNTER — OFFICE VISIT (OUTPATIENT)
Dept: ENDOCRINOLOGY | Facility: CLINIC | Age: 43
End: 2023-04-05
Payer: COMMERCIAL

## 2023-04-05 ENCOUNTER — TELEPHONE (OUTPATIENT)
Dept: ENDOCRINOLOGY | Facility: CLINIC | Age: 43
End: 2023-04-05

## 2023-04-05 VITALS
DIASTOLIC BLOOD PRESSURE: 78 MMHG | BODY MASS INDEX: 37.03 KG/M2 | WEIGHT: 250 LBS | HEART RATE: 92 BPM | OXYGEN SATURATION: 98 % | HEIGHT: 69 IN | SYSTOLIC BLOOD PRESSURE: 142 MMHG

## 2023-04-05 DIAGNOSIS — E66.9 CLASS 2 OBESITY WITH BODY MASS INDEX (BMI) OF 37.0 TO 37.9 IN ADULT, UNSPECIFIED OBESITY TYPE, UNSPECIFIED WHETHER SERIOUS COMORBIDITY PRESENT: ICD-10-CM

## 2023-04-05 DIAGNOSIS — E03.9 HYPOTHYROIDISM, UNSPECIFIED TYPE: Primary | ICD-10-CM

## 2023-04-05 LAB
HBA1C MFR BLD: 5.3 % (ref 4.8–5.6)
T4 FREE SERPL-MCNC: 1.62 NG/DL (ref 0.93–1.7)
TSH SERPL DL<=0.05 MIU/L-ACNC: 2.47 UIU/ML (ref 0.27–4.2)

## 2023-04-05 PROCEDURE — 84439 ASSAY OF FREE THYROXINE: CPT | Performed by: INTERNAL MEDICINE

## 2023-04-05 PROCEDURE — 84443 ASSAY THYROID STIM HORMONE: CPT | Performed by: INTERNAL MEDICINE

## 2023-04-05 PROCEDURE — 83036 HEMOGLOBIN GLYCOSYLATED A1C: CPT | Performed by: INTERNAL MEDICINE

## 2023-04-05 PROCEDURE — 99214 OFFICE O/P EST MOD 30 MIN: CPT | Performed by: INTERNAL MEDICINE

## 2023-04-05 RX ORDER — SEMAGLUTIDE 0.25 MG/.5ML
0.25 INJECTION, SOLUTION SUBCUTANEOUS WEEKLY
Qty: 2 ML | Refills: 2 | Status: SHIPPED | OUTPATIENT
Start: 2023-04-05

## 2023-04-05 NOTE — PROGRESS NOTES
"Chief Complaint   Patient presents with   • Hypothyroidism     Follow up         HPI   Shaunna Vora is a 42 y.o. female had concerns including Hypothyroidism (Follow up ).      Patient reports no significant health changes in the interim since her last visit.  She does report ongoing frustrations regarding inability to lose weight despite regular exercise and dietary changes.  Reviewed that cortisol testing and thyroid function were normal following last visit.  She has never taken weight loss medication.  She denies any known history of prediabetes or diabetes.    Patient continues on levothyroxine 112 mcg daily for hypothyroidism.  She denies routine missed doses of medication.    The following portions of the patient's history were reviewed and updated as appropriate: allergies, current medications and past social history.    Review of Systems   Constitutional: Positive for unexpected weight gain.   HENT: Negative for trouble swallowing and voice change.    Cardiovascular: Negative for palpitations.   Gastrointestinal: Negative for constipation and diarrhea.      /78   Pulse 92   Ht 174 cm (68.5\")   Wt 113 kg (250 lb)   SpO2 98%   BMI 37.46 kg/m²      Physical Exam      Constitutional:  well developed; well nourished  no acute distress  obese - Body mass index is 37.46 kg/m².   ENT/Thyroid: enlarged  no palpable nodules   Eyes: Conjunctiva: clear   Respiratory:  breathing is unlabored  clear to auscultation bilaterally   Cardiovascular:  regular rate and rhythm   Chest:  Not performed.   Abdomen: Not performed.   : Not performed.   Musculoskeletal: Not performed   Skin: not performed.   Neuro: mental status, speech normal   Psych: mood and affect are within normal limits       Labs/Imaging   Latest Reference Range & Units 08/17/22 08:25   Cortisol - AM mcg/dL 0.33   Testosterone, Total ng/dL 16.3   Testosterone, Free 0.10 - 0.85 ng/dL 0.52   Testosterone, Free % 0.50 - 2.80 % 3.22 (H)   TSH " Baseline 0.270 - 4.200 uIU/mL 2.430   Free T4 0.93 - 1.70 ng/dL 1.26   Iron 37 - 145 mcg/dL 67   Iron Saturation 20 - 50 % 18 (L)   Transferrin 200 - 360 mg/dL 251   TIBC 298 - 536 mcg/dL 374   Dexamethasone, Serum ng/dL 307   (H): Data is abnormally high  (L): Data is abnormally low    Diagnoses and all orders for this visit:    1. Hypothyroidism, unspecified type (Primary)  -     TSH; Future  -     T4, Free; Future  Currently taking levothyroxine 112 mcg daily  Denies routine missed doses, patient reports difficulty losing weight, otherwise clinically euthyroid  Plan to update labs today and adjust medication as clinically indicated.    2. Class 2 obesity with body mass index (BMI) of 37.0 to 37.9 in adult, unspecified obesity type, unspecified whether serious comorbidity present  -     Hemoglobin A1c; Future  Patient reports inability to lose weight despite exercise, dietary changes.  Plan to check hemoglobin A1c.  Prior evaluation for endocrine causes of difficulty losing weight was normal including thyroid function, dexamethasone suppression test.  Discussed potential use of medications to aid with weight loss.  Specifically reviewed use of GLP-1 receptor agonist such as Saxenda or Wegovy.  Discussed potential adverse effects including worsening gastric reflux, nausea, pancreatitis.  Also reviewed data regarding medullary thyroid cancer.  Prescription sent for Wegovy 0.25 mg weekly.  Discussed typical instructions for titration and when to contact the clinic.      Other orders  -     Semaglutide-Weight Management (Wegovy) 0.25 MG/0.5ML solution auto-injector; Inject 0.25 mg under the skin into the appropriate area as directed 1 (One) Time Per Week.  Dispense: 2 mL; Refill: 2      Return in about 4 months (around 8/5/2023). The patient was instructed to contact the clinic with any interval questions or concerns.    Lorene Franklin MD   Endocrinologist    Dictated Utilizing Dragon Dictation

## 2023-04-05 NOTE — TELEPHONE ENCOUNTER
PT CALLED STATING THE WEGOVY RX WE SENT IN WOULD BE $1500. SHE STATED SHE CXLD THE RX AT HER PHARM.

## 2023-04-05 NOTE — TELEPHONE ENCOUNTER
NAOMY    Pt called back to say that [employer] called her back and they enrolled her in a savings program they have and its a zero copay now.  She will trying the wegovy now. She just wanted us to know

## 2023-04-06 RX ORDER — LEVOTHYROXINE SODIUM 112 UG/1
112 TABLET ORAL DAILY
Qty: 30 TABLET | Refills: 5 | Status: SHIPPED | OUTPATIENT
Start: 2023-04-06

## 2023-10-18 ENCOUNTER — TELEPHONE (OUTPATIENT)
Dept: ENDOCRINOLOGY | Facility: CLINIC | Age: 43
End: 2023-10-18
Payer: COMMERCIAL

## 2023-10-18 DIAGNOSIS — E03.9 HYPOTHYROIDISM, UNSPECIFIED TYPE: ICD-10-CM

## 2023-10-18 RX ORDER — LEVOTHYROXINE SODIUM 112 UG/1
112 TABLET ORAL DAILY
Qty: 30 TABLET | Refills: 2 | Status: SHIPPED | OUTPATIENT
Start: 2023-10-18

## 2023-10-18 NOTE — TELEPHONE ENCOUNTER
Caller: Shaunna Vora    Relationship: Self    Best call back number: [phone]    Requested Prescriptions:   LEVOTHYROXINE     Pharmacy where request should be sent:  [facility] [place] - [phone]    Last office visit with prescribing clinician: 4/5/2023   Last telemedicine visit with prescribing clinician: Visit date not found   Next office visit with prescribing clinician: 1/3/2024     Additional details provided by patient: PT IS COMPLETELY OUT OF MED, PHARMACY STATES REQ HAS BEEN SENT SEVERAL TIMES. PLEASE CALL IN RX AND ADV PT.     Does the patient have less than a 3 day supply:  [x] Yes  [] No    Would you like a call back once the refill request has been completed: [x] Yes [] No    If the office needs to give you a call back, can they leave a voicemail: [x] Yes [] No    Partha Cowart Rep   10/18/23 11:24 EDT

## 2024-01-03 ENCOUNTER — OFFICE VISIT (OUTPATIENT)
Dept: ENDOCRINOLOGY | Facility: CLINIC | Age: 44
End: 2024-01-03
Payer: COMMERCIAL

## 2024-01-03 VITALS
DIASTOLIC BLOOD PRESSURE: 70 MMHG | SYSTOLIC BLOOD PRESSURE: 138 MMHG | HEIGHT: 69 IN | WEIGHT: 250 LBS | BODY MASS INDEX: 37.03 KG/M2 | HEART RATE: 99 BPM

## 2024-01-03 DIAGNOSIS — E03.9 HYPOTHYROIDISM, UNSPECIFIED TYPE: Primary | ICD-10-CM

## 2024-01-03 DIAGNOSIS — E66.9 CLASS 2 OBESITY WITH BODY MASS INDEX (BMI) OF 37.0 TO 37.9 IN ADULT, UNSPECIFIED OBESITY TYPE, UNSPECIFIED WHETHER SERIOUS COMORBIDITY PRESENT: ICD-10-CM

## 2024-01-03 LAB
T4 FREE SERPL-MCNC: 1.15 NG/DL (ref 0.93–1.7)
TSH SERPL DL<=0.05 MIU/L-ACNC: 9.55 UIU/ML (ref 0.27–4.2)

## 2024-01-03 PROCEDURE — 84439 ASSAY OF FREE THYROXINE: CPT | Performed by: INTERNAL MEDICINE

## 2024-01-03 PROCEDURE — 84443 ASSAY THYROID STIM HORMONE: CPT | Performed by: INTERNAL MEDICINE

## 2024-01-03 PROCEDURE — 99213 OFFICE O/P EST LOW 20 MIN: CPT | Performed by: INTERNAL MEDICINE

## 2024-01-03 NOTE — PROGRESS NOTES
"Chief Complaint   Patient presents with    Hypothyroidism        HPI   Shaunna Vora is a 43 y.o. female had concerns including Hypothyroidism.      Patient reports that she did not start Wegovy following last visit due to cost of medication.  Weight has been grossly stable.  She does report that she had COVID in the interim since last visit.  She reports that energy is recovering but not back to baseline.    Patient continues on levothyroxine 112 mcg daily for hypothyroidism.  She denies routine missed doses.    The following portions of the patient's history were reviewed and updated as appropriate: allergies, current medications, and past surgical history.    Review of Systems   Constitutional:  Positive for fatigue.        /70   Pulse 99   Ht 174 cm (68.5\")   Wt 113 kg (250 lb)   BMI 37.46 kg/m²      Physical Exam      Constitutional:  well developed; well nourished  no acute distress  obese - Body mass index is 37.46 kg/m².   ENT/Thyroid: not examined   Eyes: Conjunctiva: clear   Respiratory:  breathing is unlabored  clear to auscultation bilaterally   Cardiovascular:  regular rate and rhythm   Chest:  Not performed.   Abdomen: Not performed.   : Not performed.   Musculoskeletal: Not performed   Skin: not performed.   Neuro: mental status, speech normal   Psych: mood and affect are within normal limits       Labs/Imaging   Latest Reference Range & Units 04/05/23 10:56   Hemoglobin A1C 4.80 - 5.60 % 5.30   TSH Baseline 0.270 - 4.200 uIU/mL 2.470   Free T4 0.93 - 1.70 ng/dL 1.62       Diagnoses and all orders for this visit:    1. Hypothyroidism, unspecified type (Primary)  -     TSH; Future  -     T4, Free; Future  Thyroid function testing was normal in April 2023, patient continues on levothyroxine 112 mcg daily  Patient reports fatigue, difficulty losing weight.  We discussed potential trial of T3 containing therapy to see if this has any benefit with clinical symptoms.  We discussed potential " adverse effects including palpitations, anxiety.  Will update labs today and if feasible considering addition of liothyronine  to current medication  Patient will contact clinic in the interim between visits with any concerning symptomatic changes.    2. Class 2 obesity with body mass index (BMI) of 37.0 to 37.9 in adult, unspecified obesity type, unspecified whether serious comorbidity present  BMI 37.5, stable from prior  Patient was unable to start Wegovy given lack of insurance coverage  Discussed approval of zepbound for weight management.  Patient does not want to have this sent to pharmacy at this time.  She will contact the clinic if she changes her mind.       Return in about 6 months (around 7/3/2024) for Next scheduled follow up. The patient was instructed to contact the clinic with any interval questions or concerns.    Electronically signed by: Lorene Franklin MD   Endocrinologist    Dictated Utilizing Dragon Dictation

## 2024-01-05 RX ORDER — LIOTHYRONINE SODIUM 5 UG/1
5 TABLET ORAL DAILY
Qty: 90 TABLET | Refills: 1 | Status: SHIPPED | OUTPATIENT
Start: 2024-01-05 | End: 2025-01-04

## 2024-01-05 RX ORDER — LEVOTHYROXINE SODIUM 112 UG/1
112 TABLET ORAL DAILY
Qty: 90 TABLET | Refills: 1 | Status: SHIPPED | OUTPATIENT
Start: 2024-01-05

## 2024-03-09 DIAGNOSIS — E03.9 HYPOTHYROIDISM, UNSPECIFIED TYPE: ICD-10-CM

## 2024-03-11 RX ORDER — LEVOTHYROXINE SODIUM 112 UG/1
112 TABLET ORAL DAILY
Qty: 30 TABLET | OUTPATIENT
Start: 2024-03-11

## 2024-03-11 NOTE — TELEPHONE ENCOUNTER
Rx Refill Note    Requested Prescriptions     Pending Prescriptions Disp Refills    levothyroxine (SYNTHROID, LEVOTHROID) 112 MCG tablet [Pharmacy Med Name: LEVOTHYROXINE 0.112MG (112MCG) TABS] 30 tablet      Sig: TAKE 1 TABLET BY MOUTH DAILY        Last office visit with prescribing clinician: 1/3/2024       Next office visit with prescribing clinician: 7/17/2024     {    Araseli Cazares MA  03/11/24, 09:06 EDT

## 2024-10-28 DIAGNOSIS — E03.9 HYPOTHYROIDISM, UNSPECIFIED TYPE: ICD-10-CM

## 2024-10-29 RX ORDER — LEVOTHYROXINE SODIUM 112 UG/1
112 TABLET ORAL DAILY
Qty: 30 TABLET | Refills: 0 | Status: SHIPPED | OUTPATIENT
Start: 2024-10-29

## 2024-10-29 NOTE — TELEPHONE ENCOUNTER
Rx Refill Note  Requested Prescriptions     Pending Prescriptions Disp Refills    levothyroxine (SYNTHROID, LEVOTHROID) 112 MCG tablet [Pharmacy Med Name: LEVOTHYROXINE 0.112MG (112MCG) TABS] 30 tablet 0     Sig: TAKE 1 TABLET BY MOUTH DAILY      Last office visit with prescribing clinician: 1/3/2024   Last telemedicine visit with prescribing clinician: Visit date not found   Next office visit with prescribing clinician: 11/5/2024                         Would you like a call back once the refill request has been completed: [] Yes [] No    If the office needs to give you a call back, can they leave a voicemail: [] Yes [] No    Roxana Chavez MA  10/29/24, 13:42 EDT

## 2024-11-05 ENCOUNTER — OFFICE VISIT (OUTPATIENT)
Dept: ENDOCRINOLOGY | Facility: CLINIC | Age: 44
End: 2024-11-05
Payer: COMMERCIAL

## 2024-11-05 VITALS
OXYGEN SATURATION: 97 % | BODY MASS INDEX: 38.95 KG/M2 | SYSTOLIC BLOOD PRESSURE: 118 MMHG | DIASTOLIC BLOOD PRESSURE: 60 MMHG | HEART RATE: 104 BPM | WEIGHT: 263 LBS | HEIGHT: 69 IN

## 2024-11-05 DIAGNOSIS — E03.9 HYPOTHYROIDISM, UNSPECIFIED TYPE: ICD-10-CM

## 2024-11-05 DIAGNOSIS — R63.8 UNABLE TO LOSE WEIGHT: ICD-10-CM

## 2024-11-05 LAB
ANION GAP SERPL CALCULATED.3IONS-SCNC: 13.2 MMOL/L (ref 5–15)
BUN SERPL-MCNC: 11 MG/DL (ref 6–20)
BUN/CREAT SERPL: 12.2 (ref 7–25)
CALCIUM SPEC-SCNC: 9.2 MG/DL (ref 8.6–10.5)
CHLORIDE SERPL-SCNC: 102 MMOL/L (ref 98–107)
CO2 SERPL-SCNC: 22.8 MMOL/L (ref 22–29)
CREAT SERPL-MCNC: 0.9 MG/DL (ref 0.57–1)
EGFRCR SERPLBLD CKD-EPI 2021: 82 ML/MIN/1.73
GLUCOSE SERPL-MCNC: 117 MG/DL (ref 65–99)
POTASSIUM SERPL-SCNC: 3.8 MMOL/L (ref 3.5–5.2)
SODIUM SERPL-SCNC: 138 MMOL/L (ref 136–145)
T4 FREE SERPL-MCNC: 1.19 NG/DL (ref 0.92–1.68)
TSH SERPL DL<=0.05 MIU/L-ACNC: 14.9 UIU/ML (ref 0.27–4.2)

## 2024-11-05 PROCEDURE — 84439 ASSAY OF FREE THYROXINE: CPT | Performed by: INTERNAL MEDICINE

## 2024-11-05 PROCEDURE — 99214 OFFICE O/P EST MOD 30 MIN: CPT | Performed by: INTERNAL MEDICINE

## 2024-11-05 PROCEDURE — 80048 BASIC METABOLIC PNL TOTAL CA: CPT | Performed by: INTERNAL MEDICINE

## 2024-11-05 PROCEDURE — 84443 ASSAY THYROID STIM HORMONE: CPT | Performed by: INTERNAL MEDICINE

## 2024-11-05 RX ORDER — DEXAMETHASONE 1 MG
1 TABLET ORAL ONCE
Qty: 1 TABLET | Refills: 0 | Status: SHIPPED | OUTPATIENT
Start: 2024-11-05 | End: 2024-11-05

## 2024-11-05 NOTE — PROGRESS NOTES
"Chief Complaint   Patient presents with    Hypothyroidism        HPI   Shaunna Vora is a 43 y.o. female had concerns including Hypothyroidism.      Patient presents for follow-up of hypothyroidism, seen January 2024.  She is currently taking levothyroxine 112 mcg daily, Cytomel 5 mcg daily.  She reports that she has had a hectic start to the year as her parents have been ill and she also lost a pet.  She reports that she has been focusing less on herself and on timeframe she has gained 13 pounds since last visit.  She does report some missed doses of medication.  She reports she plans to establish care with a new PCP.  She is interested in having cortisol and kidney function checked.    The following portions of the patient's history were reviewed and updated as appropriate: allergies, current medications, and past social history.    Review of Systems   Constitutional:  Positive for unexpected weight gain.   Psychiatric/Behavioral:  Positive for stress.         /60   Pulse 104   Ht 174 cm (68.5\")   Wt 119 kg (263 lb)   SpO2 97%   BMI 39.41 kg/m²      Physical Exam      Constitutional:  well developed; well nourished  no acute distress  appears stated age   ENT/Thyroid: no palpable nodules   Eyes: Conjunctiva: clear   Respiratory:  breathing is unlabored  clear to auscultation bilaterally   Cardiovascular:  regular rate and rhythm   Chest:  Not performed.   Abdomen: Not performed.   : Not performed.   Musculoskeletal: Not performed   Skin: not performed.   Neuro: mental status, speech normal   Psych: mood and affect are within normal limits       Labs/Imaging   Latest Reference Range & Units 01/03/24 16:08   TSH Baseline 0.270 - 4.200 uIU/mL 9.550 (H)   Free T4 0.93 - 1.70 ng/dL 1.15   (H): Data is abnormally high    Diagnoses and all orders for this visit:    1. Hypothyroidism, unspecified type  -     TSH  -     T4, Free  Patient is currently prescribed levothyroxine 112 mcg daily, liothyronine 5 " mcg daily.  She denies any adverse effects and starting liothyronine but is unsure if this has been beneficial.  She does report missed doses of medication.  Discussed with patient that it will be difficult to interpret labs in the setting of missed medications but patient would like to proceed with blood draw today given duration since last check.  Determine next labs after review of labs    2. Unable to lose weight  -     Dexamethasone Level, Serum; Future  -     Cortisol - AM; Future  -     Basic Metabolic Panel  Plan for dexamethasone suppression testing to evaluate for hypercortisolism per patient request.  Check BMP.    Other orders  -     dexAMETHasone (DECADRON) 1 MG tablet; Take 1 tablet by mouth 1 (One) Time for 1 dose. Take at 11 PM night before lab draw at 8 AM  Dispense: 1 tablet; Refill: 0      Return in about 4 months (around 3/5/2025). The patient was instructed to contact the clinic with any interval questions or concerns.    Electronically signed by: Lorene Franklin MD   Endocrinologist    Dictated Utilizing Dragon Dictation

## 2024-11-11 ENCOUNTER — TELEPHONE (OUTPATIENT)
Dept: ENDOCRINOLOGY | Facility: CLINIC | Age: 44
End: 2024-11-11
Payer: COMMERCIAL

## 2024-11-12 DIAGNOSIS — E03.9 HYPOTHYROIDISM, UNSPECIFIED TYPE: ICD-10-CM

## 2024-11-12 RX ORDER — LIOTHYRONINE SODIUM 5 UG/1
5 TABLET ORAL DAILY
Qty: 90 TABLET | Refills: 1 | Status: SHIPPED | OUTPATIENT
Start: 2024-11-12 | End: 2025-11-12

## 2024-11-12 RX ORDER — LEVOTHYROXINE SODIUM 137 UG/1
137 TABLET ORAL DAILY
Qty: 90 TABLET | Refills: 1 | Status: SHIPPED | OUTPATIENT
Start: 2024-11-12